# Patient Record
Sex: FEMALE | Race: WHITE | NOT HISPANIC OR LATINO
[De-identification: names, ages, dates, MRNs, and addresses within clinical notes are randomized per-mention and may not be internally consistent; named-entity substitution may affect disease eponyms.]

---

## 2017-01-19 ENCOUNTER — NON-APPOINTMENT (OUTPATIENT)
Age: 77
End: 2017-01-19

## 2017-01-19 ENCOUNTER — APPOINTMENT (OUTPATIENT)
Dept: CARDIOLOGY | Facility: CLINIC | Age: 77
End: 2017-01-19

## 2017-01-19 VITALS
OXYGEN SATURATION: 90 % | HEIGHT: 58 IN | WEIGHT: 150 LBS | HEART RATE: 70 BPM | BODY MASS INDEX: 31.49 KG/M2 | SYSTOLIC BLOOD PRESSURE: 133 MMHG | TEMPERATURE: 98 F | DIASTOLIC BLOOD PRESSURE: 70 MMHG

## 2017-01-22 ENCOUNTER — NON-APPOINTMENT (OUTPATIENT)
Age: 77
End: 2017-01-22

## 2017-01-25 ENCOUNTER — NON-APPOINTMENT (OUTPATIENT)
Age: 77
End: 2017-01-25

## 2017-01-31 ENCOUNTER — MEDICATION RENEWAL (OUTPATIENT)
Age: 77
End: 2017-01-31

## 2017-02-10 ENCOUNTER — RX RENEWAL (OUTPATIENT)
Age: 77
End: 2017-02-10

## 2017-05-24 ENCOUNTER — MEDICATION RENEWAL (OUTPATIENT)
Age: 77
End: 2017-05-24

## 2017-06-20 ENCOUNTER — NON-APPOINTMENT (OUTPATIENT)
Age: 77
End: 2017-06-20

## 2017-06-20 ENCOUNTER — APPOINTMENT (OUTPATIENT)
Dept: CARDIOLOGY | Facility: CLINIC | Age: 77
End: 2017-06-20

## 2017-06-20 VITALS
DIASTOLIC BLOOD PRESSURE: 70 MMHG | SYSTOLIC BLOOD PRESSURE: 125 MMHG | TEMPERATURE: 97.9 F | WEIGHT: 149 LBS | BODY MASS INDEX: 31.28 KG/M2 | HEART RATE: 61 BPM | OXYGEN SATURATION: 96 % | HEIGHT: 58 IN

## 2017-06-22 LAB
ALBUMIN SERPL ELPH-MCNC: 4.3 G/DL
ALP BLD-CCNC: 74 U/L
ALT SERPL-CCNC: 19 U/L
ANION GAP SERPL CALC-SCNC: 21 MMOL/L
AST SERPL-CCNC: 17 U/L
BASOPHILS # BLD AUTO: 0.04 K/UL
BASOPHILS NFR BLD AUTO: 0.6 %
BILIRUB SERPL-MCNC: 0.8 MG/DL
BUN SERPL-MCNC: 17 MG/DL
CALCIUM SERPL-MCNC: 10.2 MG/DL
CHLORIDE SERPL-SCNC: 96 MMOL/L
CHOLEST SERPL-MCNC: 165 MG/DL
CHOLEST/HDLC SERPL: 2 RATIO
CO2 SERPL-SCNC: 20 MMOL/L
CREAT SERPL-MCNC: 0.67 MG/DL
EOSINOPHIL # BLD AUTO: 0.06 K/UL
EOSINOPHIL NFR BLD AUTO: 0.9 %
GLUCOSE SERPL-MCNC: 101 MG/DL
HBA1C MFR BLD HPLC: 5.9 %
HCT VFR BLD CALC: 39 %
HDLC SERPL-MCNC: 81 MG/DL
HGB BLD-MCNC: 12.7 G/DL
IMM GRANULOCYTES NFR BLD AUTO: 0.2 %
LDLC SERPL CALC-MCNC: 72 MG/DL
LYMPHOCYTES # BLD AUTO: 1.52 K/UL
LYMPHOCYTES NFR BLD AUTO: 23.6 %
MAN DIFF?: NORMAL
MCHC RBC-ENTMCNC: 29.5 PG
MCHC RBC-ENTMCNC: 32.6 GM/DL
MCV RBC AUTO: 90.5 FL
MONOCYTES # BLD AUTO: 0.69 K/UL
MONOCYTES NFR BLD AUTO: 10.7 %
NEUTROPHILS # BLD AUTO: 4.13 K/UL
NEUTROPHILS NFR BLD AUTO: 64 %
PLATELET # BLD AUTO: 232 K/UL
POTASSIUM SERPL-SCNC: 4.4 MMOL/L
PROT SERPL-MCNC: 7 G/DL
RBC # BLD: 4.31 M/UL
RBC # FLD: 13.4 %
SODIUM SERPL-SCNC: 137 MMOL/L
TRIGL SERPL-MCNC: 61 MG/DL
TSH SERPL-ACNC: 1.32 UIU/ML
WBC # FLD AUTO: 6.45 K/UL

## 2017-07-27 ENCOUNTER — MEDICATION RENEWAL (OUTPATIENT)
Age: 77
End: 2017-07-27

## 2017-08-04 ENCOUNTER — MEDICATION RENEWAL (OUTPATIENT)
Age: 77
End: 2017-08-04

## 2017-10-30 ENCOUNTER — APPOINTMENT (OUTPATIENT)
Dept: CARDIOLOGY | Facility: CLINIC | Age: 77
End: 2017-10-30
Payer: MEDICARE

## 2017-10-30 PROCEDURE — 93000 ELECTROCARDIOGRAM COMPLETE: CPT

## 2017-10-30 PROCEDURE — 99214 OFFICE O/P EST MOD 30 MIN: CPT

## 2017-10-30 PROCEDURE — 36415 COLL VENOUS BLD VENIPUNCTURE: CPT

## 2017-11-01 ENCOUNTER — MEDICATION RENEWAL (OUTPATIENT)
Age: 77
End: 2017-11-01

## 2017-12-19 ENCOUNTER — APPOINTMENT (OUTPATIENT)
Dept: CARDIOLOGY | Facility: CLINIC | Age: 77
End: 2017-12-19
Payer: MEDICARE

## 2017-12-19 ENCOUNTER — APPOINTMENT (OUTPATIENT)
Dept: CARDIOLOGY | Facility: CLINIC | Age: 77
End: 2017-12-19

## 2017-12-19 ENCOUNTER — NON-APPOINTMENT (OUTPATIENT)
Age: 77
End: 2017-12-19

## 2017-12-19 VITALS — DIASTOLIC BLOOD PRESSURE: 80 MMHG | SYSTOLIC BLOOD PRESSURE: 140 MMHG

## 2017-12-19 VITALS
WEIGHT: 151 LBS | BODY MASS INDEX: 31.56 KG/M2 | SYSTOLIC BLOOD PRESSURE: 170 MMHG | HEART RATE: 68 BPM | OXYGEN SATURATION: 97 % | DIASTOLIC BLOOD PRESSURE: 86 MMHG

## 2017-12-19 DIAGNOSIS — M79.1 MYALGIA: ICD-10-CM

## 2017-12-19 PROCEDURE — 99214 OFFICE O/P EST MOD 30 MIN: CPT

## 2017-12-19 PROCEDURE — 36415 COLL VENOUS BLD VENIPUNCTURE: CPT

## 2017-12-19 PROCEDURE — 93000 ELECTROCARDIOGRAM COMPLETE: CPT

## 2017-12-20 LAB — ERYTHROCYTE [SEDIMENTATION RATE] IN BLOOD BY WESTERGREN METHOD: 22 MM/HR

## 2018-01-28 ENCOUNTER — TRANSCRIPTION ENCOUNTER (OUTPATIENT)
Age: 78
End: 2018-01-28

## 2018-05-29 ENCOUNTER — RX RENEWAL (OUTPATIENT)
Age: 78
End: 2018-05-29

## 2018-06-11 ENCOUNTER — APPOINTMENT (OUTPATIENT)
Dept: CARDIOLOGY | Facility: CLINIC | Age: 78
End: 2018-06-11
Payer: MEDICARE

## 2018-06-11 ENCOUNTER — NON-APPOINTMENT (OUTPATIENT)
Age: 78
End: 2018-06-11

## 2018-06-11 VITALS
HEART RATE: 68 BPM | OXYGEN SATURATION: 97 % | BODY MASS INDEX: 29.89 KG/M2 | DIASTOLIC BLOOD PRESSURE: 76 MMHG | SYSTOLIC BLOOD PRESSURE: 184 MMHG | WEIGHT: 143 LBS

## 2018-06-11 PROCEDURE — 99214 OFFICE O/P EST MOD 30 MIN: CPT

## 2018-06-11 PROCEDURE — 93000 ELECTROCARDIOGRAM COMPLETE: CPT

## 2018-06-12 ENCOUNTER — MEDICATION RENEWAL (OUTPATIENT)
Age: 78
End: 2018-06-12

## 2018-06-12 LAB
ALBUMIN SERPL ELPH-MCNC: 4 G/DL
ALP BLD-CCNC: 72 U/L
ALT SERPL-CCNC: 22 U/L
ANION GAP SERPL CALC-SCNC: 16 MMOL/L
AST SERPL-CCNC: 17 U/L
BASOPHILS # BLD AUTO: 0.01 K/UL
BASOPHILS NFR BLD AUTO: 0.1 %
BILIRUB SERPL-MCNC: 0.5 MG/DL
BUN SERPL-MCNC: 19 MG/DL
CALCIUM SERPL-MCNC: 9.6 MG/DL
CHLORIDE SERPL-SCNC: 97 MMOL/L
CHOLEST SERPL-MCNC: 173 MG/DL
CHOLEST/HDLC SERPL: 1.9 RATIO
CO2 SERPL-SCNC: 24 MMOL/L
CREAT SERPL-MCNC: 0.53 MG/DL
EOSINOPHIL # BLD AUTO: 0.04 K/UL
EOSINOPHIL NFR BLD AUTO: 0.5 %
GLUCOSE SERPL-MCNC: 102 MG/DL
HBA1C MFR BLD HPLC: 5.9 %
HCT VFR BLD CALC: 39.1 %
HDLC SERPL-MCNC: 91 MG/DL
HGB BLD-MCNC: 12.3 G/DL
IMM GRANULOCYTES NFR BLD AUTO: 0.3 %
LDLC SERPL CALC-MCNC: 71 MG/DL
LYMPHOCYTES # BLD AUTO: 1.45 K/UL
LYMPHOCYTES NFR BLD AUTO: 19.8 %
MAN DIFF?: NORMAL
MCHC RBC-ENTMCNC: 30.5 PG
MCHC RBC-ENTMCNC: 31.5 GM/DL
MCV RBC AUTO: 97 FL
MONOCYTES # BLD AUTO: 0.75 K/UL
MONOCYTES NFR BLD AUTO: 10.2 %
NEUTROPHILS # BLD AUTO: 5.05 K/UL
NEUTROPHILS NFR BLD AUTO: 69.1 %
PLATELET # BLD AUTO: 224 K/UL
POTASSIUM SERPL-SCNC: 4.6 MMOL/L
PROT SERPL-MCNC: 6.4 G/DL
RBC # BLD: 4.03 M/UL
RBC # FLD: 14 %
SODIUM SERPL-SCNC: 137 MMOL/L
TRIGL SERPL-MCNC: 55 MG/DL
TSH SERPL-ACNC: 1.46 UIU/ML
WBC # FLD AUTO: 7.32 K/UL

## 2018-06-19 ENCOUNTER — MEDICATION RENEWAL (OUTPATIENT)
Age: 78
End: 2018-06-19

## 2018-08-13 ENCOUNTER — MEDICATION RENEWAL (OUTPATIENT)
Age: 78
End: 2018-08-13

## 2018-08-14 ENCOUNTER — RX RENEWAL (OUTPATIENT)
Age: 78
End: 2018-08-14

## 2018-08-27 ENCOUNTER — APPOINTMENT (OUTPATIENT)
Dept: CARDIOLOGY | Facility: CLINIC | Age: 78
End: 2018-08-27
Payer: MEDICARE

## 2018-08-27 ENCOUNTER — NON-APPOINTMENT (OUTPATIENT)
Age: 78
End: 2018-08-27

## 2018-08-27 VITALS
BODY MASS INDEX: 31.07 KG/M2 | OXYGEN SATURATION: 97 % | SYSTOLIC BLOOD PRESSURE: 151 MMHG | HEIGHT: 58 IN | WEIGHT: 148 LBS | HEART RATE: 75 BPM | TEMPERATURE: 98.1 F | DIASTOLIC BLOOD PRESSURE: 70 MMHG

## 2018-08-27 DIAGNOSIS — R73.9 HYPERGLYCEMIA, UNSPECIFIED: ICD-10-CM

## 2018-08-27 PROCEDURE — 93000 ELECTROCARDIOGRAM COMPLETE: CPT

## 2018-08-27 PROCEDURE — 99214 OFFICE O/P EST MOD 30 MIN: CPT

## 2018-10-06 ENCOUNTER — TRANSCRIPTION ENCOUNTER (OUTPATIENT)
Age: 78
End: 2018-10-06

## 2018-10-23 ENCOUNTER — APPOINTMENT (OUTPATIENT)
Dept: CARDIOLOGY | Facility: CLINIC | Age: 78
End: 2018-10-23
Payer: MEDICARE

## 2018-10-23 PROCEDURE — G0008: CPT

## 2018-10-23 PROCEDURE — 90662 IIV NO PRSV INCREASED AG IM: CPT

## 2018-11-29 ENCOUNTER — APPOINTMENT (OUTPATIENT)
Dept: CARDIOLOGY | Facility: CLINIC | Age: 78
End: 2018-11-29

## 2018-11-30 ENCOUNTER — APPOINTMENT (OUTPATIENT)
Dept: CARDIOLOGY | Facility: CLINIC | Age: 78
End: 2018-11-30
Payer: MEDICARE

## 2018-11-30 ENCOUNTER — NON-APPOINTMENT (OUTPATIENT)
Age: 78
End: 2018-11-30

## 2018-11-30 VITALS
DIASTOLIC BLOOD PRESSURE: 80 MMHG | BODY MASS INDEX: 31.07 KG/M2 | OXYGEN SATURATION: 99 % | SYSTOLIC BLOOD PRESSURE: 138 MMHG | WEIGHT: 148 LBS | HEART RATE: 68 BPM | HEIGHT: 58 IN

## 2018-11-30 PROCEDURE — 99214 OFFICE O/P EST MOD 30 MIN: CPT

## 2018-11-30 PROCEDURE — 93000 ELECTROCARDIOGRAM COMPLETE: CPT

## 2018-12-26 ENCOUNTER — NON-APPOINTMENT (OUTPATIENT)
Age: 78
End: 2018-12-26

## 2018-12-26 ENCOUNTER — APPOINTMENT (OUTPATIENT)
Dept: CARDIOLOGY | Facility: CLINIC | Age: 78
End: 2018-12-26
Payer: MEDICARE

## 2018-12-26 ENCOUNTER — RX RENEWAL (OUTPATIENT)
Age: 78
End: 2018-12-26

## 2018-12-26 VITALS
HEIGHT: 58 IN | SYSTOLIC BLOOD PRESSURE: 145 MMHG | TEMPERATURE: 99.1 F | HEART RATE: 72 BPM | OXYGEN SATURATION: 97 % | BODY MASS INDEX: 31.7 KG/M2 | WEIGHT: 151 LBS | DIASTOLIC BLOOD PRESSURE: 67 MMHG

## 2018-12-26 DIAGNOSIS — R07.9 CHEST PAIN, UNSPECIFIED: ICD-10-CM

## 2018-12-26 DIAGNOSIS — J20.9 ACUTE BRONCHITIS, UNSPECIFIED: ICD-10-CM

## 2018-12-26 PROCEDURE — 93000 ELECTROCARDIOGRAM COMPLETE: CPT

## 2018-12-26 PROCEDURE — 36415 COLL VENOUS BLD VENIPUNCTURE: CPT

## 2018-12-26 PROCEDURE — 99214 OFFICE O/P EST MOD 30 MIN: CPT

## 2018-12-26 NOTE — PHYSICAL EXAM
[General Appearance - Well Developed] : well developed [Normal Appearance] : normal appearance [Well Groomed] : well groomed [General Appearance - Well Nourished] : well nourished [No Deformities] : no deformities [General Appearance - In No Acute Distress] : no acute distress [Normal Conjunctiva] : the conjunctiva exhibited no abnormalities [Eyelids - No Xanthelasma] : the eyelids demonstrated no xanthelasmas [Normal Oral Mucosa] : normal oral mucosa [No Oral Pallor] : no oral pallor [No Oral Cyanosis] : no oral cyanosis [Normal Jugular Venous A Waves Present] : normal jugular venous A waves present [Normal Jugular Venous V Waves Present] : normal jugular venous V waves present [No Jugular Venous Cuevas A Waves] : no jugular venous cuevas A waves [Respiration, Rhythm And Depth] : normal respiratory rhythm and effort [Exaggerated Use Of Accessory Muscles For Inspiration] : no accessory muscle use [Auscultation Breath Sounds / Voice Sounds] : lungs were clear to auscultation bilaterally [Heart Rate And Rhythm] : heart rate and rhythm were normal [Heart Sounds] : normal S1 and S2 [Murmurs] : no murmurs present [Abdomen Soft] : soft [Abdomen Tenderness] : non-tender [Abdomen Mass (___ Cm)] : no abdominal mass palpated [Abnormal Walk] : normal gait [Gait - Sufficient For Exercise Testing] : the gait was sufficient for exercise testing [Nail Clubbing] : no clubbing of the fingernails [Cyanosis, Localized] : no localized cyanosis [Petechial Hemorrhages (___cm)] : no petechial hemorrhages [Skin Color & Pigmentation] : normal skin color and pigmentation [] : no rash [No Venous Stasis] : no venous stasis [Skin Lesions] : no skin lesions [No Skin Ulcers] : no skin ulcer [No Xanthoma] : no  xanthoma was observed [Oriented To Time, Place, And Person] : oriented to person, place, and time [Affect] : the affect was normal [Mood] : the mood was normal [FreeTextEntry1] : anxious

## 2018-12-26 NOTE — REVIEW OF SYSTEMS
[Loss Of Hearing] : hearing loss [Dyspnea on exertion] : dyspnea during exertion [Joint Pain] : joint pain [Negative] : Heme/Lymph [Fever] : no fever [Recent Weight Gain (___ Lbs)] : no recent weight gain [Chills] : no chills [Recent Weight Loss (___ Lbs)] : no recent weight loss [Chest  Pressure] : no chest pressure [Chest Pain] : chest pain [Lower Ext Edema] : no extremity edema [Leg Claudication] : no intermittent leg claudication [Palpitations] : no palpitations [see HPI] : see HPI [Cough] : cough [Wheezing] : no wheezing [Coughing Up Blood] : no hemoptysis [Abdominal Pain] : no abdominal pain [Nausea] : no nausea [Heartburn] : no heartburn [Change in Appetite] : no change in appetite [Change In The Stool] : no change in stool [Dysuria] : no dysuria [Dizziness] : no dizziness [Anxiety] : anxiety

## 2018-12-26 NOTE — REASON FOR VISIT
[FreeTextEntry1] : The patient is a 78-year-old woman with a history of hypertension, hypercholesterolemia, and carotid stenosis for visit for edema

## 2018-12-26 NOTE — HISTORY OF PRESENT ILLNESS
[FreeTextEntry1] : June 20, 2017. 77-year-old woman with diabetes mellitus, hyperlipidemia, hypertension who continues to complain of palpitations when she is stressed. She feels her heart being fast. It lasts for a minute or 2. The palpitations then gradually slowed down. She has no associated symptoms. The patient denies exertional dyspnea or chest pressure. She has no lightheadedness or ankle swelling.\par October 30, 2017. Saw Dr. Rosenstein for blurred vision suddenly (not amaurosis. Normal carotid Doppler 10/26/2017. He noted skipped beats or irregular pulse. Here now, feels fine except occasional  rapid beats. In sinus but occasional PC's on exam, with sx. Recent labs with Cholesterol 161, TG 65, HDL 74, LDL 73. HbA1c 5.8. Normal TFTs. Previous echo with minimal MR, AS?AI. Holter with only symptomatic VPCs.\par August 27, 2018. Here for concern over new edema left leg. Not tender,red, hot, etc. No trauma but has arthritis in that knee and recent failed steroid injection. Concerned it's her heart. Workup negative for cardiac and venous Doppler negative for DVT, but positive for Bakers cyst. It was drained by radiology.\par November 30, 2018. Patient here in followup. Had labs October 5, with normal CMP, fasting glucose 98, cholesterol 178, HDL 84, LDL 80. Normal TSH, normal CBC. Asked about baby aspirin, which she has been taking for years. Had negative carotid Dopplers a few years ago, with no evidence of plaque by neurology. No indication for aspirin, so she will stop it. Complained about trouble sleeping, and once tried Ambien from her internist, and didn't like it, but she will followup with the internist.

## 2018-12-26 NOTE — PHYSICAL EXAM
[General Appearance - Well Developed] : well developed [Normal Appearance] : normal appearance [Well Groomed] : well groomed [General Appearance - Well Nourished] : well nourished [No Deformities] : no deformities [General Appearance - In No Acute Distress] : no acute distress [Normal Conjunctiva] : the conjunctiva exhibited no abnormalities [Eyelids - No Xanthelasma] : the eyelids demonstrated no xanthelasmas [Normal Oral Mucosa] : normal oral mucosa [No Oral Pallor] : no oral pallor [No Oral Cyanosis] : no oral cyanosis [Normal Jugular Venous A Waves Present] : normal jugular venous A waves present [Normal Jugular Venous V Waves Present] : normal jugular venous V waves present [No Jugular Venous Cuevas A Waves] : no jugular venous cuevas A waves [Respiration, Rhythm And Depth] : normal respiratory rhythm and effort [Exaggerated Use Of Accessory Muscles For Inspiration] : no accessory muscle use [Heart Rate And Rhythm] : heart rate and rhythm were normal [Heart Sounds] : normal S1 and S2 [Murmurs] : no murmurs present [Abdomen Soft] : soft [Abdomen Tenderness] : non-tender [Abdomen Mass (___ Cm)] : no abdominal mass palpated [Abnormal Walk] : normal gait [Gait - Sufficient For Exercise Testing] : the gait was sufficient for exercise testing [Nail Clubbing] : no clubbing of the fingernails [Cyanosis, Localized] : no localized cyanosis [Petechial Hemorrhages (___cm)] : no petechial hemorrhages [Skin Color & Pigmentation] : normal skin color and pigmentation [] : no rash [No Venous Stasis] : no venous stasis [Skin Lesions] : no skin lesions [No Skin Ulcers] : no skin ulcer [No Xanthoma] : no  xanthoma was observed [Oriented To Time, Place, And Person] : oriented to person, place, and time [Affect] : the affect was normal [Mood] : the mood was normal [FreeTextEntry1] : anxious

## 2018-12-26 NOTE — HISTORY OF PRESENT ILLNESS
[FreeTextEntry1] : June 20, 2017. 77-year-old woman with diabetes mellitus, hyperlipidemia, hypertension who continues to complain of palpitations when she is stressed. She feels her heart being fast. It lasts for a minute or 2. The palpitations then gradually slowed down. She has no associated symptoms. The patient denies exertional dyspnea or chest pressure. She has no lightheadedness or ankle swelling.\par October 30, 2017. Saw Dr. Rosenstein for blurred vision suddenly (not amaurosis). Normal carotid Doppler 10/26/2017. He noted skipped beats or irregular pulse. Here now, feels fine except occasional  rapid beats. In sinus but occasional PC's on exam, with sx. Recent labs with Cholesterol 161, TG 65, HDL 74, LDL 73. HbA1c 5.8. Normal TFTs. Previous echo with minimal MR, AS?AI. Holter with only symptomatic VPCs.\par August 27, 2018. Here for concern over new edema left leg. Not tender,red, hot, etc. No trauma but has arthritis in that knee and recent failed steroid injection. Concerned it's her heart. Workup negative for cardiac and venous Doppler negative for DVT, but positive for Bakers cyst. It was drained by radiology.\par November 30, 2018. Patient here in followup. Had labs October 5, with normal CMP, fasting glucose 98, cholesterol 178, HDL 84, LDL 80. Normal TSH, normal CBC. Asked about baby aspirin, which she has been taking for years. Had negative carotid Dopplers a few years ago, with no evidence of plaque by neurology. No indication for aspirin, so she will stop it. Complained about trouble sleeping, and once tried Ambien from her internist, and didn't like it, but she will followup with the internist.\par December 26, 2018. Patient called earlier today. She had chest pain over Jules day and is concerned. She's also had worsening of her edema and was told to stop her steroids.\par EKG here is sinus rhythm with APCs, but otherwise within normal limits. \par Actually had symptoms of cough, and her internist gave her both Tamiflu and c and then subsequently gave her a Medrol pack, possibly twic She is better than initially, but still has symptoms of shortness of breath with exertion, especially when she tried to walk. Both her dogs. The other night, and concerned about the edema in her legs. Still has productive cough as well and is concerned about being able to make her flight to [place] on January 6. She also cannot hear out of her left ear because it is clogged\par

## 2018-12-26 NOTE — REASON FOR VISIT
[FreeTextEntry1] : The patient is a 78-year-old woman with a history of hypertension, hypercholesterolemia, and carotid stenosis for visit for urgent visit re: chest pain and edema

## 2018-12-26 NOTE — REVIEW OF SYSTEMS
[Loss Of Hearing] : hearing loss [Dyspnea on exertion] : dyspnea during exertion [see HPI] : see HPI [Joint Pain] : joint pain [Negative] : Heme/Lymph [Recent Weight Gain (___ Lbs)] : no recent weight gain [Recent Weight Loss (___ Lbs)] : no recent weight loss [Chest  Pressure] : no chest pressure [Chest Pain] : no chest pain [Lower Ext Edema] : no extremity edema [Leg Claudication] : no intermittent leg claudication [Palpitations] : no palpitations [Cough] : no cough [Wheezing] : no wheezing [Coughing Up Blood] : no hemoptysis [Abdominal Pain] : no abdominal pain [Nausea] : no nausea [Heartburn] : no heartburn [Change in Appetite] : no change in appetite [Dysuria] : no dysuria [Dizziness] : no dizziness

## 2018-12-27 LAB
ALBUMIN SERPL ELPH-MCNC: 3.8 G/DL
ALP BLD-CCNC: 60 U/L
ALT SERPL-CCNC: 17 U/L
ANION GAP SERPL CALC-SCNC: 13 MMOL/L
AST SERPL-CCNC: 15 U/L
BASOPHILS # BLD AUTO: 0.02 K/UL
BASOPHILS NFR BLD AUTO: 0.2 %
BILIRUB SERPL-MCNC: 0.5 MG/DL
BUN SERPL-MCNC: 18 MG/DL
CALCIUM SERPL-MCNC: 9.5 MG/DL
CHLORIDE SERPL-SCNC: 100 MMOL/L
CHOLEST SERPL-MCNC: 149 MG/DL
CHOLEST/HDLC SERPL: 2.4 RATIO
CO2 SERPL-SCNC: 25 MMOL/L
CREAT SERPL-MCNC: 0.67 MG/DL
EOSINOPHIL # BLD AUTO: 0.05 K/UL
EOSINOPHIL NFR BLD AUTO: 0.6 %
GLUCOSE SERPL-MCNC: 84 MG/DL
HBA1C MFR BLD HPLC: 6.6 %
HCT VFR BLD CALC: 34.8 %
HDLC SERPL-MCNC: 63 MG/DL
HGB BLD-MCNC: 11.1 G/DL
IMM GRANULOCYTES NFR BLD AUTO: 0.4 %
LDLC SERPL CALC-MCNC: 71 MG/DL
LYMPHOCYTES # BLD AUTO: 1.75 K/UL
LYMPHOCYTES NFR BLD AUTO: 19.5 %
MAN DIFF?: NORMAL
MCHC RBC-ENTMCNC: 29.3 PG
MCHC RBC-ENTMCNC: 31.9 GM/DL
MCV RBC AUTO: 91.8 FL
MONOCYTES # BLD AUTO: 0.82 K/UL
MONOCYTES NFR BLD AUTO: 9.1 %
NEUTROPHILS # BLD AUTO: 6.29 K/UL
NEUTROPHILS NFR BLD AUTO: 70.2 %
NT-PROBNP SERPL-MCNC: 65 PG/ML
PLATELET # BLD AUTO: 326 K/UL
POTASSIUM SERPL-SCNC: 4.4 MMOL/L
PROT SERPL-MCNC: 7 G/DL
RBC # BLD: 3.79 M/UL
RBC # FLD: 14 %
SODIUM SERPL-SCNC: 138 MMOL/L
TRIGL SERPL-MCNC: 74 MG/DL
WBC # FLD AUTO: 8.97 K/UL

## 2019-02-21 ENCOUNTER — MEDICATION RENEWAL (OUTPATIENT)
Age: 79
End: 2019-02-21

## 2019-02-22 ENCOUNTER — MEDICATION RENEWAL (OUTPATIENT)
Age: 79
End: 2019-02-22

## 2019-03-05 ENCOUNTER — TRANSCRIPTION ENCOUNTER (OUTPATIENT)
Age: 79
End: 2019-03-05

## 2019-05-07 ENCOUNTER — MEDICATION RENEWAL (OUTPATIENT)
Age: 79
End: 2019-05-07

## 2019-05-16 ENCOUNTER — RX RENEWAL (OUTPATIENT)
Age: 79
End: 2019-05-16

## 2019-05-20 ENCOUNTER — MEDICATION RENEWAL (OUTPATIENT)
Age: 79
End: 2019-05-20

## 2019-07-29 ENCOUNTER — LABORATORY RESULT (OUTPATIENT)
Age: 79
End: 2019-07-29

## 2019-07-29 ENCOUNTER — MEDICATION RENEWAL (OUTPATIENT)
Age: 79
End: 2019-07-29

## 2019-07-29 ENCOUNTER — APPOINTMENT (OUTPATIENT)
Dept: CARDIOLOGY | Facility: CLINIC | Age: 79
End: 2019-07-29
Payer: MEDICARE

## 2019-07-29 ENCOUNTER — NON-APPOINTMENT (OUTPATIENT)
Age: 79
End: 2019-07-29

## 2019-07-29 VITALS — HEART RATE: 63 BPM | SYSTOLIC BLOOD PRESSURE: 175 MMHG | DIASTOLIC BLOOD PRESSURE: 75 MMHG

## 2019-07-29 VITALS
HEART RATE: 68 BPM | DIASTOLIC BLOOD PRESSURE: 76 MMHG | WEIGHT: 148 LBS | SYSTOLIC BLOOD PRESSURE: 183 MMHG | OXYGEN SATURATION: 99 % | HEIGHT: 58 IN | TEMPERATURE: 97.9 F | BODY MASS INDEX: 31.07 KG/M2

## 2019-07-29 VITALS — HEART RATE: 58 BPM | DIASTOLIC BLOOD PRESSURE: 80 MMHG | SYSTOLIC BLOOD PRESSURE: 154 MMHG

## 2019-07-29 DIAGNOSIS — G47.00 INSOMNIA, UNSPECIFIED: ICD-10-CM

## 2019-07-29 PROCEDURE — 36415 COLL VENOUS BLD VENIPUNCTURE: CPT

## 2019-07-29 PROCEDURE — 93000 ELECTROCARDIOGRAM COMPLETE: CPT

## 2019-07-29 PROCEDURE — 99214 OFFICE O/P EST MOD 30 MIN: CPT

## 2019-07-29 NOTE — HISTORY OF PRESENT ILLNESS
[FreeTextEntry1] : June 20, 2017. 77-year-old woman with diabetes mellitus, hyperlipidemia, hypertension who continues to complain of palpitations when she is stressed. She feels her heart being fast. It lasts for a minute or 2. The palpitations then gradually slowed down. She has no associated symptoms. The patient denies exertional dyspnea or chest pressure. She has no lightheadedness or ankle swelling.\par October 30, 2017. Saw Dr. Rosenstein for blurred vision suddenly (not amaurosis). Normal carotid Doppler 10/26/2017. He noted skipped beats or irregular pulse. Here now, feels fine except occasional  rapid beats. In sinus but occasional PC's on exam, with sx. Recent labs with Cholesterol 161, TG 65, HDL 74, LDL 73. HbA1c 5.8. Normal TFTs. Previous echo with minimal MR, AS?AI. Holter with only symptomatic VPCs.\par August 27, 2018. Here for concern over new edema left leg. Not tender,red, hot, etc. No trauma but has arthritis in that knee and recent failed steroid injection. Concerned it's her heart. Workup negative for cardiac and venous Doppler negative for DVT, but positive for Bakers cyst. It was drained by radiology.\par November 30, 2018. Patient here in followup. Had labs October 5, with normal CMP, fasting glucose 98, cholesterol 178, HDL 84, LDL 80. Normal TSH, normal CBC. Asked about baby aspirin, which she has been taking for years. Had negative carotid Dopplers a few years ago, with no evidence of plaque by neurology. No indication for aspirin, so she will stop it. Complained about trouble sleeping, and once tried Ambien from her internist, and didn't like it, but she will followup with the internist.\par December 26, 2018. Patient called earlier today. She had chest pain over Jules day and is concerned. She's also had worsening of her edema and was told to stop her steroids.\par EKG here is sinus rhythm with APCs, but otherwise within normal limits. \par Actually had symptoms of cough, and her internist gave her both Tamiflu and c and then subsequently gave her a Medrol pack, possibly twice She is better than initially, but still has symptoms of shortness of breath with exertion, especially when she tried to walk. Both her dogs. The other night, and concerned about the edema in her legs. Still has productive cough as well and is concerned about being able to make her flight to [place] on January 6. She also cannot hear out of her left ear because it is clogged\par May 2019. Patient called from [place], anxious that her blood pressure is always in the 140s to 150s over 70. Reassured her but then increased her verapamil from 180 q.12 h. to 240 q.12 h.\par July 29, 2019. Patient here in followup. Remains with sinus bradycardia at 58 with an otherwise normal ECG and no evidence of LVH. Outside readings of her blood pressure, mostly in the 130s occasionally even 120/60-70. She was told by the doctors in [place] of extra beats, but she never notices them. No other complaints except trouble sleeping and needing zolpidem.

## 2019-07-29 NOTE — REVIEW OF SYSTEMS
[Fever] : no fever [Chills] : no chills [Recent Weight Gain (___ Lbs)] : no recent weight gain [Recent Weight Loss (___ Lbs)] : no recent weight loss [Loss Of Hearing] : hearing loss [Chest  Pressure] : no chest pressure [Dyspnea on exertion] : dyspnea during exertion [Chest Pain] : no chest pain [Lower Ext Edema] : no extremity edema [Leg Claudication] : no intermittent leg claudication [Palpitations] : no palpitations [Cough] : no cough [Coughing Up Blood] : no hemoptysis [Wheezing] : no wheezing [Abdominal Pain] : no abdominal pain [Nausea] : no nausea [Change in Appetite] : no change in appetite [Change In The Stool] : no change in stool [Heartburn] : heartburn [Dysuria] : no dysuria [see HPI] : see HPI [Joint Pain] : joint pain [Dizziness] : no dizziness [Anxiety] : anxiety [Negative] : Heme/Lymph

## 2019-07-29 NOTE — REASON FOR VISIT
[FreeTextEntry1] : The patient is a 79-year-old woman with a history of hypertension, hypercholesterolemia, and carotid stenosis for visit

## 2019-08-04 LAB
ALBUMIN SERPL ELPH-MCNC: 4.1 G/DL
ALP BLD-CCNC: 69 U/L
ALT SERPL-CCNC: 18 U/L
ANION GAP SERPL CALC-SCNC: 10 MMOL/L
AST SERPL-CCNC: 19 U/L
BASOPHILS # BLD AUTO: 0.05 K/UL
BASOPHILS NFR BLD AUTO: 0.6 %
BILIRUB SERPL-MCNC: 0.5 MG/DL
BUN SERPL-MCNC: 15 MG/DL
CALCIUM SERPL-MCNC: 9.6 MG/DL
CHLORIDE SERPL-SCNC: 99 MMOL/L
CHOLEST SERPL-MCNC: 160 MG/DL
CHOLEST/HDLC SERPL: 2.2 RATIO
CO2 SERPL-SCNC: 27 MMOL/L
CREAT SERPL-MCNC: 0.48 MG/DL
EOSINOPHIL # BLD AUTO: 0.08 K/UL
EOSINOPHIL NFR BLD AUTO: 1 %
ERYTHROCYTE [SEDIMENTATION RATE] IN BLOOD BY WESTERGREN METHOD: 15 MM/HR
ESTIMATED AVERAGE GLUCOSE: 126 MG/DL
GLUCOSE SERPL-MCNC: 95 MG/DL
HBA1C MFR BLD HPLC: 6 %
HCT VFR BLD CALC: 41.5 %
HDLC SERPL-MCNC: 72 MG/DL
HGB BLD-MCNC: 13 G/DL
IMM GRANULOCYTES NFR BLD AUTO: 0.4 %
LDLC SERPL CALC-MCNC: 75 MG/DL
LYMPHOCYTES # BLD AUTO: 1.33 K/UL
LYMPHOCYTES NFR BLD AUTO: 16.3 %
MAN DIFF?: NORMAL
MCHC RBC-ENTMCNC: 30.5 PG
MCHC RBC-ENTMCNC: 31.3 GM/DL
MCV RBC AUTO: 97.4 FL
MONOCYTES # BLD AUTO: 0.89 K/UL
MONOCYTES NFR BLD AUTO: 10.9 %
NEUTROPHILS # BLD AUTO: 5.79 K/UL
NEUTROPHILS NFR BLD AUTO: 70.8 %
NT-PROBNP SERPL-MCNC: 84 PG/ML
PLATELET # BLD AUTO: 251 K/UL
POTASSIUM SERPL-SCNC: 4.7 MMOL/L
PROT SERPL-MCNC: 6.4 G/DL
RBC # BLD: 4.26 M/UL
RBC # FLD: 13.5 %
SODIUM SERPL-SCNC: 136 MMOL/L
T3RU NFR SERPL: 1.1 TBI
T4 FREE SERPL-MCNC: 1.1 NG/DL
T4 SERPL-MCNC: 6.8 UG/DL
TRIGL SERPL-MCNC: 65 MG/DL
TSH SERPL-ACNC: 1.71 UIU/ML
URATE SERPL-MCNC: 2.9 MG/DL
WBC # FLD AUTO: 8.17 K/UL

## 2019-08-06 ENCOUNTER — RX RENEWAL (OUTPATIENT)
Age: 79
End: 2019-08-06

## 2019-08-13 ENCOUNTER — MEDICATION RENEWAL (OUTPATIENT)
Age: 79
End: 2019-08-13

## 2019-09-05 ENCOUNTER — MEDICATION RENEWAL (OUTPATIENT)
Age: 79
End: 2019-09-05

## 2019-11-07 ENCOUNTER — NON-APPOINTMENT (OUTPATIENT)
Age: 79
End: 2019-11-07

## 2019-11-07 ENCOUNTER — APPOINTMENT (OUTPATIENT)
Dept: CARDIOLOGY | Facility: CLINIC | Age: 79
End: 2019-11-07
Payer: MEDICARE

## 2019-11-07 VITALS
BODY MASS INDEX: 31.07 KG/M2 | HEART RATE: 60 BPM | SYSTOLIC BLOOD PRESSURE: 129 MMHG | DIASTOLIC BLOOD PRESSURE: 75 MMHG | OXYGEN SATURATION: 98 % | WEIGHT: 148 LBS | HEIGHT: 58 IN

## 2019-11-07 DIAGNOSIS — R00.2 PALPITATIONS: ICD-10-CM

## 2019-11-07 PROCEDURE — 93000 ELECTROCARDIOGRAM COMPLETE: CPT

## 2019-11-07 PROCEDURE — 99214 OFFICE O/P EST MOD 30 MIN: CPT

## 2019-11-07 PROCEDURE — 36415 COLL VENOUS BLD VENIPUNCTURE: CPT

## 2019-11-07 NOTE — REVIEW OF SYSTEMS
[Recent Weight Gain (___ Lbs)] : no recent weight gain [Fever] : no fever [Recent Weight Loss (___ Lbs)] : no recent weight loss [Chills] : no chills [Loss Of Hearing] : hearing loss [Chest  Pressure] : no chest pressure [Dyspnea on exertion] : dyspnea during exertion [Chest Pain] : no chest pain [Lower Ext Edema] : no extremity edema [Leg Claudication] : no intermittent leg claudication [Palpitations] : no palpitations [Cough] : no cough [Coughing Up Blood] : no hemoptysis [Wheezing] : no wheezing [Abdominal Pain] : no abdominal pain [Nausea] : no nausea [Change In The Stool] : no change in stool [Change in Appetite] : no change in appetite [Heartburn] : heartburn [Joint Pain] : joint pain [Dysuria] : no dysuria [see HPI] : see HPI [Dizziness] : no dizziness [Anxiety] : anxiety [Negative] : Endocrine

## 2019-11-07 NOTE — PHYSICAL EXAM
[General Appearance - Well Developed] : well developed [Normal Appearance] : normal appearance [No Deformities] : no deformities [General Appearance - Well Nourished] : well nourished [Well Groomed] : well groomed [Normal Conjunctiva] : the conjunctiva exhibited no abnormalities [General Appearance - In No Acute Distress] : no acute distress [Normal Oral Mucosa] : normal oral mucosa [No Oral Pallor] : no oral pallor [Eyelids - No Xanthelasma] : the eyelids demonstrated no xanthelasmas [Normal Jugular Venous A Waves Present] : normal jugular venous A waves present [No Oral Cyanosis] : no oral cyanosis [No Jugular Venous Cuevas A Waves] : no jugular venous cuevas A waves [Normal Jugular Venous V Waves Present] : normal jugular venous V waves present [Respiration, Rhythm And Depth] : normal respiratory rhythm and effort [Auscultation Breath Sounds / Voice Sounds] : lungs were clear to auscultation bilaterally [Exaggerated Use Of Accessory Muscles For Inspiration] : no accessory muscle use [Heart Rate And Rhythm] : heart rate and rhythm were normal [Heart Sounds] : normal S1 and S2 [Murmurs] : no murmurs present [Abdomen Soft] : soft [Abdomen Tenderness] : non-tender [Abdomen Mass (___ Cm)] : no abdominal mass palpated [Abnormal Walk] : normal gait [Gait - Sufficient For Exercise Testing] : the gait was sufficient for exercise testing [Nail Clubbing] : no clubbing of the fingernails [Cyanosis, Localized] : no localized cyanosis [Petechial Hemorrhages (___cm)] : no petechial hemorrhages [No Venous Stasis] : no venous stasis [] : no rash [Skin Color & Pigmentation] : normal skin color and pigmentation [No Skin Ulcers] : no skin ulcer [No Xanthoma] : no  xanthoma was observed [Skin Lesions] : no skin lesions [Affect] : the affect was normal [Oriented To Time, Place, And Person] : oriented to person, place, and time [Mood] : the mood was normal [FreeTextEntry1] : anxious

## 2019-11-07 NOTE — HISTORY OF PRESENT ILLNESS
[FreeTextEntry1] : June 20, 2017. 77-year-old woman with diabetes mellitus, hyperlipidemia, hypertension who continues to complain of palpitations when she is stressed. She feels her heart being fast. It lasts for a minute or 2. The palpitations then gradually slowed down. She has no associated symptoms. The patient denies exertional dyspnea or chest pressure. She has no lightheadedness or ankle swelling.\par October 30, 2017. Saw Dr. Rosenstein for blurred vision suddenly (not amaurosis). Normal carotid Doppler 10/26/2017. He noted skipped beats or irregular pulse. Here now, feels fine except occasional  rapid beats. In sinus but occasional PC's on exam, with sx. Recent labs with Cholesterol 161, TG 65, HDL 74, LDL 73. HbA1c 5.8. Normal TFTs. Previous echo with minimal MR, AS?AI. Holter with only symptomatic VPCs.\par August 27, 2018. Here for concern over new edema left leg. Not tender,red, hot, etc. No trauma but has arthritis in that knee and recent failed steroid injection. Concerned it's her heart. Workup negative for cardiac and venous Doppler negative for DVT, but positive for Bakers cyst. It was drained by radiology.\par November 30, 2018. Patient here in followup. Had labs October 5, with normal CMP, fasting glucose 98, cholesterol 178, HDL 84, LDL 80. Normal TSH, normal CBC. Asked about baby aspirin, which she has been taking for years. Had negative carotid Dopplers a few years ago, with no evidence of plaque by neurology. No indication for aspirin, so she will stop it. Complained about trouble sleeping, and once tried Ambien from her internist, and didn't like it, but she will followup with the internist.\par December 26, 2018. Patient called earlier today. She had chest pain over Jules day and is concerned. She's also had worsening of her edema and was told to stop her steroids.\par EKG here is sinus rhythm with APCs, but otherwise within normal limits. \par Actually had symptoms of cough, and her internist gave her both Tamiflu and c and then subsequently gave her a Medrol pack, possibly twice She is better than initially, but still has symptoms of shortness of breath with exertion, especially when she tried to walk. Both her dogs. The other night, and concerned about the edema in her legs. Still has productive cough as well and is concerned about being able to make her flight to [place] on January 6. She also cannot hear out of her left ear because it is clogged\par May 2019. Patient called from [place], anxious that her blood pressure is always in the 140s to 150s over 70. Reassured her but then increased her verapamil from 180 q.12 h. to 240 q.12 h.\par July 29, 2019. Patient here in followup. Remains with sinus bradycardia at 58 with an otherwise normal ECG and no evidence of LVH. Outside readings of her blood pressure, mostly in the 130s occasionally even 120/60-70. She was told by the doctors in [place] of extra beats, but she never notices them. No other complaints except trouble sleeping and needing zolpidem.\par November 7, 2019.  Patient returns in follow-up.  EKG remains in sinus rhythm 60 and unchanged.  Good spirits feeling well blood pressure excellent EKG within normal limits.  Her internist took a CBC along with a sed rate because of her previous history of PMR but all was acceptable.  Her orthopedist wants her to have a vitamin D level and a calcium level because of her history of osteoporosis.  She is a little confused as to whether to remain on extra calcium; she takes 500 with vitamin D.

## 2019-11-11 ENCOUNTER — RX CHANGE (OUTPATIENT)
Age: 79
End: 2019-11-11

## 2019-11-11 ENCOUNTER — MEDICATION RENEWAL (OUTPATIENT)
Age: 79
End: 2019-11-11

## 2019-11-11 LAB
ALBUMIN SERPL ELPH-MCNC: 4.3 G/DL
ALP BLD-CCNC: 71 U/L
ALT SERPL-CCNC: 20 U/L
ANION GAP SERPL CALC-SCNC: 14 MMOL/L
AST SERPL-CCNC: 15 U/L
BILIRUB SERPL-MCNC: 0.6 MG/DL
BUN SERPL-MCNC: 18 MG/DL
CALCIUM SERPL-MCNC: 9.9 MG/DL
CHLORIDE SERPL-SCNC: 98 MMOL/L
CHOLEST SERPL-MCNC: 164 MG/DL
CHOLEST/HDLC SERPL: 2.2 RATIO
CO2 SERPL-SCNC: 25 MMOL/L
CREAT SERPL-MCNC: 0.54 MG/DL
ESTIMATED AVERAGE GLUCOSE: 126 MG/DL
GLUCOSE SERPL-MCNC: 94 MG/DL
HBA1C MFR BLD HPLC: 6 %
HDLC SERPL-MCNC: 75 MG/DL
LDLC SERPL CALC-MCNC: 74 MG/DL
POTASSIUM SERPL-SCNC: 4.4 MMOL/L
PROT SERPL-MCNC: 6.5 G/DL
SODIUM SERPL-SCNC: 137 MMOL/L
TRIGL SERPL-MCNC: 73 MG/DL

## 2019-12-05 ENCOUNTER — APPOINTMENT (OUTPATIENT)
Dept: CARDIOLOGY | Facility: CLINIC | Age: 79
End: 2019-12-05
Payer: MEDICARE

## 2019-12-05 ENCOUNTER — RX RENEWAL (OUTPATIENT)
Age: 79
End: 2019-12-05

## 2019-12-05 ENCOUNTER — NON-APPOINTMENT (OUTPATIENT)
Age: 79
End: 2019-12-05

## 2019-12-05 VITALS
BODY MASS INDEX: 31.7 KG/M2 | HEIGHT: 58 IN | DIASTOLIC BLOOD PRESSURE: 71 MMHG | HEART RATE: 78 BPM | WEIGHT: 151 LBS | OXYGEN SATURATION: 95 % | SYSTOLIC BLOOD PRESSURE: 152 MMHG

## 2019-12-05 DIAGNOSIS — M17.12 UNILATERAL PRIMARY OSTEOARTHRITIS, LEFT KNEE: ICD-10-CM

## 2019-12-05 DIAGNOSIS — I49.3 VENTRICULAR PREMATURE DEPOLARIZATION: ICD-10-CM

## 2019-12-05 PROCEDURE — 93000 ELECTROCARDIOGRAM COMPLETE: CPT

## 2019-12-05 PROCEDURE — 99214 OFFICE O/P EST MOD 30 MIN: CPT

## 2019-12-05 NOTE — HISTORY OF PRESENT ILLNESS
[FreeTextEntry1] : June 20, 2017. 77-year-old woman with diabetes mellitus, hyperlipidemia, hypertension who continues to complain of palpitations when she is stressed. She feels her heart being fast. It lasts for a minute or 2. The palpitations then gradually slowed down. She has no associated symptoms. The patient denies exertional dyspnea or chest pressure. She has no lightheadedness or ankle swelling.\par October 30, 2017. Saw Dr. Rosenstein for blurred vision suddenly (not amaurosis). Normal carotid Doppler 10/26/2017. He noted skipped beats or irregular pulse. Here now, feels fine except occasional  rapid beats. In sinus but occasional PC's on exam, with sx. Recent labs with Cholesterol 161, TG 65, HDL 74, LDL 73. HbA1c 5.8. Normal TFTs. Previous echo with minimal MR, AS?AI. Holter with only symptomatic VPCs.\par August 27, 2018. Here for concern over new edema left leg. Not tender,red, hot, etc. No trauma but has arthritis in that knee and recent failed steroid injection. Concerned it's her heart. Workup negative for cardiac and venous Doppler negative for DVT, but positive for Bakers cyst. It was drained by radiology.\par November 30, 2018. Patient here in followup. Had labs October 5, with normal CMP, fasting glucose 98, cholesterol 178, HDL 84, LDL 80. Normal TSH, normal CBC. Asked about baby aspirin, which she has been taking for years. Had negative carotid Dopplers a few years ago, with no evidence of plaque by neurology. No indication for aspirin, so she will stop it. Complained about trouble sleeping, and once tried Ambien from her internist, and didn't like it, but she will followup with the internist.\par December 26, 2018. Patient called earlier today. She had chest pain over Jules day and is concerned. She's also had worsening of her edema and was told to stop her steroids.\par EKG here is sinus rhythm with APCs, but otherwise within normal limits. \par Actually had symptoms of cough, and her internist gave her both Tamiflu and c and then subsequently gave her a Medrol pack, possibly twice She is better than initially, but still has symptoms of shortness of breath with exertion, especially when she tried to walk. Both her dogs. The other night, and concerned about the edema in her legs. Still has productive cough as well and is concerned about being able to make her flight to [place] on January 6. She also cannot hear out of her left ear because it is clogged\par May 2019. Patient called from [place], anxious that her blood pressure is always in the 140s to 150s over 70. Reassured her but then increased her verapamil from 180 q.12 h. to 240 q.12 h.\par July 29, 2019. Patient here in followup. Remains with sinus bradycardia at 58 with an otherwise normal ECG and no evidence of LVH. Outside readings of her blood pressure, mostly in the 130s occasionally even 120/60-70. She was told by the doctors in [place] of extra beats, but she never notices them. No other complaints except trouble sleeping and needing zolpidem.\par November 7, 2019.  Patient returns in follow-up.  EKG remains in sinus rhythm 60 and unchanged.  Good spirits feeling well blood pressure excellent EKG within normal limits.  Her internist took a CBC along with a sed rate because of her previous history of PMR but all was acceptable.  Her orthopedist wants her to have a vitamin D level and a calcium level because of her history of osteoporosis.  She is a little confused as to whether to remain on extra calcium; she takes 500 with vitamin D.  \par December 5, 2019.  Patient called this morning and came in very upset because of edema of her right leg.  Last week she developed the edema and her internist put her on torsemide 20 mg and potassium 20 mEq.  After 3 days she became very lightheaded and dizzy.  He told her to stop it and then when it return he told her to resume and then cut it in half.  She came in today because she is very worried.  It is obvious that the edema is not related to heart failure and it is very mild.  Torsemide is fairly strong.  She had issues with swelling in the other leg about a year ago when she has had recent injections in her right knee for arthritis and a work-up for possible recurrence of PMR despite a normal sed rate.  There is no shortness of breath PND orthopnea chest pain or change in her other status

## 2019-12-05 NOTE — PHYSICAL EXAM
[General Appearance - Well Developed] : well developed [Normal Appearance] : normal appearance [Well Groomed] : well groomed [General Appearance - Well Nourished] : well nourished [No Deformities] : no deformities [General Appearance - In No Acute Distress] : no acute distress [Normal Conjunctiva] : the conjunctiva exhibited no abnormalities [Eyelids - No Xanthelasma] : the eyelids demonstrated no xanthelasmas [Normal Oral Mucosa] : normal oral mucosa [No Oral Pallor] : no oral pallor [No Oral Cyanosis] : no oral cyanosis [Normal Jugular Venous A Waves Present] : normal jugular venous A waves present [Normal Jugular Venous V Waves Present] : normal jugular venous V waves present [No Jugular Venous Cuevas A Waves] : no jugular venous cuevas A waves [Respiration, Rhythm And Depth] : normal respiratory rhythm and effort [Exaggerated Use Of Accessory Muscles For Inspiration] : no accessory muscle use [Auscultation Breath Sounds / Voice Sounds] : lungs were clear to auscultation bilaterally [Heart Rate And Rhythm] : heart rate and rhythm were normal [Heart Sounds] : normal S1 and S2 [Murmurs] : no murmurs present [Abdomen Soft] : soft [Abdomen Tenderness] : non-tender [Abdomen Mass (___ Cm)] : no abdominal mass palpated [Abnormal Walk] : normal gait [Gait - Sufficient For Exercise Testing] : the gait was sufficient for exercise testing [Nail Clubbing] : no clubbing of the fingernails [Cyanosis, Localized] : no localized cyanosis [Petechial Hemorrhages (___cm)] : no petechial hemorrhages [Skin Color & Pigmentation] : normal skin color and pigmentation [] : no rash [No Venous Stasis] : no venous stasis [Skin Lesions] : no skin lesions [No Skin Ulcers] : no skin ulcer [Oriented To Time, Place, And Person] : oriented to person, place, and time [Affect] : the affect was normal [No Xanthoma] : no  xanthoma was observed [Mood] : the mood was normal [FreeTextEntry1] : anxious

## 2019-12-05 NOTE — REVIEW OF SYSTEMS
[Loss Of Hearing] : hearing loss [see HPI] : see HPI [Joint Pain] : joint pain [Anxiety] : anxiety [Negative] : Heme/Lymph [Fever] : no fever [Recent Weight Gain (___ Lbs)] : no recent weight gain [Chills] : no chills [Recent Weight Loss (___ Lbs)] : no recent weight loss [Dyspnea on exertion] : not dyspnea during exertion [Chest  Pressure] : no chest pressure [Chest Pain] : no chest pain [Lower Ext Edema] : lower extremity edema [Leg Claudication] : no intermittent leg claudication [Palpitations] : no palpitations [Cough] : no cough [Wheezing] : no wheezing [Coughing Up Blood] : no hemoptysis [Abdominal Pain] : no abdominal pain [Nausea] : no nausea [Heartburn] : no heartburn [Change in Appetite] : no change in appetite [Change In The Stool] : no change in stool [Dysuria] : no dysuria [Dizziness] : no dizziness

## 2019-12-05 NOTE — REASON FOR VISIT
[FreeTextEntry1] : The patient is a 79-year-old woman with a history of hypertension, hypercholesterolemia, and carotid stenosis for urgent visit

## 2019-12-06 ENCOUNTER — APPOINTMENT (OUTPATIENT)
Dept: CARDIOLOGY | Facility: CLINIC | Age: 79
End: 2019-12-06

## 2019-12-09 ENCOUNTER — MEDICATION RENEWAL (OUTPATIENT)
Age: 79
End: 2019-12-09

## 2020-01-07 ENCOUNTER — RX RENEWAL (OUTPATIENT)
Age: 80
End: 2020-01-07

## 2020-02-26 ENCOUNTER — RX RENEWAL (OUTPATIENT)
Age: 80
End: 2020-02-26

## 2020-03-18 ENCOUNTER — RX RENEWAL (OUTPATIENT)
Age: 80
End: 2020-03-18

## 2020-04-22 ENCOUNTER — RX RENEWAL (OUTPATIENT)
Age: 80
End: 2020-04-22

## 2020-06-09 ENCOUNTER — RX RENEWAL (OUTPATIENT)
Age: 80
End: 2020-06-09

## 2020-07-20 ENCOUNTER — RX RENEWAL (OUTPATIENT)
Age: 80
End: 2020-07-20

## 2020-09-24 ENCOUNTER — NON-APPOINTMENT (OUTPATIENT)
Age: 80
End: 2020-09-24

## 2020-10-09 ENCOUNTER — RX RENEWAL (OUTPATIENT)
Age: 80
End: 2020-10-09

## 2020-10-26 ENCOUNTER — APPOINTMENT (OUTPATIENT)
Dept: CARDIOLOGY | Facility: CLINIC | Age: 80
End: 2020-10-26
Payer: MEDICARE

## 2020-10-26 ENCOUNTER — NON-APPOINTMENT (OUTPATIENT)
Age: 80
End: 2020-10-26

## 2020-10-26 VITALS
HEART RATE: 66 BPM | DIASTOLIC BLOOD PRESSURE: 74 MMHG | OXYGEN SATURATION: 100 % | HEIGHT: 58 IN | BODY MASS INDEX: 30.86 KG/M2 | WEIGHT: 147 LBS | SYSTOLIC BLOOD PRESSURE: 172 MMHG | RESPIRATION RATE: 17 BRPM | TEMPERATURE: 97.3 F

## 2020-10-26 VITALS — HEART RATE: 64 BPM | SYSTOLIC BLOOD PRESSURE: 190 MMHG | DIASTOLIC BLOOD PRESSURE: 85 MMHG

## 2020-10-26 DIAGNOSIS — Z23 ENCOUNTER FOR IMMUNIZATION: ICD-10-CM

## 2020-10-26 DIAGNOSIS — H61.23 IMPACTED CERUMEN, BILATERAL: ICD-10-CM

## 2020-10-26 DIAGNOSIS — Z00.00 ENCOUNTER FOR GENERAL ADULT MEDICAL EXAMINATION W/OUT ABNORMAL FINDINGS: ICD-10-CM

## 2020-10-26 PROCEDURE — 90662 IIV NO PRSV INCREASED AG IM: CPT

## 2020-10-26 PROCEDURE — 93000 ELECTROCARDIOGRAM COMPLETE: CPT

## 2020-10-26 PROCEDURE — G0008: CPT

## 2020-10-26 PROCEDURE — 99215 OFFICE O/P EST HI 40 MIN: CPT | Mod: 25

## 2020-10-26 PROCEDURE — 36415 COLL VENOUS BLD VENIPUNCTURE: CPT

## 2020-10-26 NOTE — HISTORY OF PRESENT ILLNESS
[FreeTextEntry1] : June 20, 2017. 77-year-old woman with diabetes mellitus, hyperlipidemia, hypertension who continues to complain of palpitations when she is stressed. She feels her heart being fast. It lasts for a minute or 2. The palpitations then gradually slowed down. She has no associated symptoms. The patient denies exertional dyspnea or chest pressure. She has no lightheadedness or ankle swelling.\par October 30, 2017. Saw Dr. Rosenstein for blurred vision suddenly (not amaurosis). Normal carotid Doppler 10/26/2017. He noted skipped beats or irregular pulse. Here now, feels fine except occasional  rapid beats. In sinus but occasional PC's on exam, with sx. Recent labs with Cholesterol 161, TG 65, HDL 74, LDL 73. HbA1c 5.8. Normal TFTs. Previous echo with minimal MR, AS?AI. Holter with only symptomatic VPCs.\par August 27, 2018. Here for concern over new edema left leg. Not tender,red, hot, etc. No trauma but has arthritis in that knee and recent failed steroid injection. Concerned it's her heart. Workup negative for cardiac and venous Doppler negative for DVT, but positive for Bakers cyst. It was drained by radiology.\par November 30, 2018. Patient here in followup. Had labs October 5, with normal CMP, fasting glucose 98, cholesterol 178, HDL 84, LDL 80. Normal TSH, normal CBC. Asked about baby aspirin, which she has been taking for years. Had negative carotid Dopplers a few years ago, with no evidence of plaque by neurology. No indication for aspirin, so she will stop it. Complained about trouble sleeping, and once tried Ambien from her internist, and didn't like it, but she will followup with the internist.\par December 26, 2018. Patient called earlier today. She had chest pain over Jules day and is concerned. She's also had worsening of her edema and was told to stop her steroids.\par EKG here is sinus rhythm with APCs, but otherwise within normal limits. \par Actually had symptoms of cough, and her internist gave her both Tamiflu and c and then subsequently gave her a Medrol pack, possibly twice She is better than initially, but still has symptoms of shortness of breath with exertion, especially when she tried to walk. Both her dogs. The other night, and concerned about the edema in her legs. Still has productive cough as well and is concerned about being able to make her flight to [place] on January 6. She also cannot hear out of her left ear because it is clogged\par May 2019. Patient called from [place], anxious that her blood pressure is always in the 140s to 150s over 70. Reassured her but then increased her verapamil from 180 q.12 h. to 240 q.12 h.\par July 29, 2019. Patient here in followup. Remains with sinus bradycardia at 58 with an otherwise normal ECG and no evidence of LVH. Outside readings of her blood pressure, mostly in the 130s occasionally even 120/60-70. She was told by the doctors in [place] of extra beats, but she never notices them. No other complaints except trouble sleeping and needing zolpidem.\par November 7, 2019.  Patient returns in follow-up.  EKG remains in sinus rhythm 60 and unchanged.  Good spirits feeling well blood pressure excellent EKG within normal limits.  Her internist took a CBC along with a sed rate because of her previous history of PMR but all was acceptable.  Her orthopedist wants her to have a vitamin D level and a calcium level because of her history of osteoporosis.  She is a little confused as to whether to remain on extra calcium; she takes 500 with vitamin D.  \par December 5, 2019.  Patient called this morning and came in very upset because of edema of her right leg.  Last week she developed the edema and her internist put her on torsemide 20 mg and potassium 20 mEq.  After 3 days she became very lightheaded and dizzy.  He told her to stop it and then when it return he told her to resume and then cut it in half.  She came in today because she is very worried.  It is obvious that the edema is not related to heart failure and it is very mild.  Torsemide is fairly strong.  She had issues with swelling in the other leg about a year ago when she has had recent injections in her right knee for arthritis and a work-up for possible recurrence of PMR despite a normal sed rate.  There is no shortness of breath PND orthopnea chest pain or change in her other status.\par October 26, 2020.  First visit since December of last year.  Remains in sinus rhythm at 62 with a normal ECG other than poor R wave progression.  Her PMR came back and she is having trouble getting off the prednisone; now on 5 mg daily.  This is closed her blood pressure to be elevated along with the stress from COVID-19 and she is very nervous because she has risk factors.  She asked to have both her sed rate and her blood type checked today.  Flu shot given.

## 2020-10-26 NOTE — REASON FOR VISIT
[FreeTextEntry1] : The patient is a 80-year-old woman with a history of hypertension, hypercholesterolemia, and carotid stenosis for follow up visit

## 2020-10-26 NOTE — REVIEW OF SYSTEMS
[Loss Of Hearing] : hearing loss [Lower Ext Edema] : lower extremity edema [see HPI] : see HPI [Joint Pain] : joint pain [Anxiety] : anxiety [Negative] : Heme/Lymph [Fever] : no fever [Recent Weight Gain (___ Lbs)] : no recent weight gain [Chills] : no chills [Recent Weight Loss (___ Lbs)] : recent [unfilled] ~Ulb weight loss [Dyspnea on exertion] : not dyspnea during exertion [Chest  Pressure] : no chest pressure [Chest Pain] : no chest pain [Leg Claudication] : no intermittent leg claudication [Palpitations] : no palpitations [Cough] : no cough [Wheezing] : no wheezing [Coughing Up Blood] : no hemoptysis [Abdominal Pain] : no abdominal pain [Nausea] : no nausea [Heartburn] : no heartburn [Change in Appetite] : no change in appetite [Change In The Stool] : no change in stool [Dysuria] : no dysuria [Dizziness] : no dizziness

## 2020-10-27 LAB
ABO + RH PNL BLD: NORMAL
ALBUMIN SERPL ELPH-MCNC: 4.7 G/DL
ALP BLD-CCNC: 82 U/L
ALT SERPL-CCNC: 19 U/L
ANION GAP SERPL CALC-SCNC: 13 MMOL/L
AST SERPL-CCNC: 15 U/L
BASOPHILS # BLD AUTO: 0.08 K/UL
BASOPHILS NFR BLD AUTO: 0.9 %
BILIRUB SERPL-MCNC: 0.7 MG/DL
BUN SERPL-MCNC: 15 MG/DL
CALCIUM SERPL-MCNC: 10.4 MG/DL
CHLORIDE SERPL-SCNC: 96 MMOL/L
CHOLEST SERPL-MCNC: 171 MG/DL
CO2 SERPL-SCNC: 28 MMOL/L
CREAT SERPL-MCNC: 0.49 MG/DL
EOSINOPHIL # BLD AUTO: 0.06 K/UL
EOSINOPHIL NFR BLD AUTO: 0.7 %
ERYTHROCYTE [SEDIMENTATION RATE] IN BLOOD BY WESTERGREN METHOD: 30 MM/HR
ESTIMATED AVERAGE GLUCOSE: 123 MG/DL
GLUCOSE SERPL-MCNC: 94 MG/DL
HBA1C MFR BLD HPLC: 5.9 %
HCT VFR BLD CALC: 41.8 %
HDLC SERPL-MCNC: 80 MG/DL
HGB BLD-MCNC: 13.4 G/DL
IMM GRANULOCYTES NFR BLD AUTO: 0.6 %
LDLC SERPL CALC-MCNC: 74 MG/DL
LYMPHOCYTES # BLD AUTO: 1.66 K/UL
LYMPHOCYTES NFR BLD AUTO: 18.5 %
MAN DIFF?: NORMAL
MCHC RBC-ENTMCNC: 30.8 PG
MCHC RBC-ENTMCNC: 32.1 GM/DL
MCV RBC AUTO: 96.1 FL
MONOCYTES # BLD AUTO: 0.93 K/UL
MONOCYTES NFR BLD AUTO: 10.4 %
NEUTROPHILS # BLD AUTO: 6.18 K/UL
NEUTROPHILS NFR BLD AUTO: 68.9 %
NONHDLC SERPL-MCNC: 91 MG/DL
NT-PROBNP SERPL-MCNC: 93 PG/ML
PLATELET # BLD AUTO: 284 K/UL
POTASSIUM SERPL-SCNC: 4.4 MMOL/L
PROT SERPL-MCNC: 6.9 G/DL
RBC # BLD: 4.35 M/UL
RBC # FLD: 12.5 %
SARS-COV-2 IGG SERPL IA-ACNC: <0.1 INDEX
SARS-COV-2 IGG SERPL QL IA: NEGATIVE
SODIUM SERPL-SCNC: 136 MMOL/L
TRIGL SERPL-MCNC: 88 MG/DL
TSH SERPL-ACNC: 1.95 UIU/ML
WBC # FLD AUTO: 8.96 K/UL

## 2020-11-25 ENCOUNTER — APPOINTMENT (OUTPATIENT)
Dept: CARDIOLOGY | Facility: CLINIC | Age: 80
End: 2020-11-25
Payer: MEDICARE

## 2020-11-25 VITALS — DIASTOLIC BLOOD PRESSURE: 88 MMHG | SYSTOLIC BLOOD PRESSURE: 160 MMHG

## 2020-11-25 VITALS
OXYGEN SATURATION: 97 % | HEART RATE: 73 BPM | DIASTOLIC BLOOD PRESSURE: 88 MMHG | SYSTOLIC BLOOD PRESSURE: 170 MMHG | BODY MASS INDEX: 31.14 KG/M2 | WEIGHT: 149 LBS | TEMPERATURE: 98.2 F

## 2020-11-25 VITALS — HEART RATE: 72 BPM | DIASTOLIC BLOOD PRESSURE: 82 MMHG | SYSTOLIC BLOOD PRESSURE: 160 MMHG

## 2020-11-25 PROCEDURE — 36415 COLL VENOUS BLD VENIPUNCTURE: CPT

## 2020-11-25 PROCEDURE — 99213 OFFICE O/P EST LOW 20 MIN: CPT

## 2020-11-25 RX ORDER — LOSARTAN POTASSIUM 100 MG/1
100 TABLET, FILM COATED ORAL DAILY
Qty: 90 | Refills: 1 | Status: DISCONTINUED | COMMUNITY
Start: 2019-11-11 | End: 2020-11-25

## 2020-11-25 RX ORDER — HYDROCHLOROTHIAZIDE 25 MG/1
25 TABLET ORAL DAILY
Qty: 90 | Refills: 1 | Status: DISCONTINUED | COMMUNITY
Start: 2019-11-11 | End: 2020-11-25

## 2020-11-25 NOTE — REVIEW OF SYSTEMS
[Fever] : no fever [Recent Weight Gain (___ Lbs)] : no recent weight gain [Chills] : no chills [Recent Weight Loss (___ Lbs)] : no recent weight loss [Loss Of Hearing] : hearing loss [Dyspnea on exertion] : not dyspnea during exertion [Chest  Pressure] : no chest pressure [Chest Pain] : no chest pain [Lower Ext Edema] : lower extremity edema [Leg Claudication] : no intermittent leg claudication [Palpitations] : no palpitations [Cough] : no cough [Wheezing] : no wheezing [Coughing Up Blood] : no hemoptysis [Abdominal Pain] : no abdominal pain [Nausea] : no nausea [Change in Appetite] : no change in appetite [Change In The Stool] : no change in stool [Heartburn] : heartburn [Dysuria] : no dysuria [see HPI] : see HPI [Joint Pain] : joint pain [Dizziness] : no dizziness [Anxiety] : anxiety [Negative] : Heme/Lymph

## 2020-11-25 NOTE — REASON FOR VISIT
[FreeTextEntry1] : The patient is a 80-year-old woman with a history of hypertension, hypercholesterolemia, and carotid stenosis for follow up visit still with PMR

## 2020-11-25 NOTE — HISTORY OF PRESENT ILLNESS
[FreeTextEntry1] : June 20, 2017. 77-year-old woman with diabetes mellitus, hyperlipidemia, hypertension who continues to complain of palpitations when she is stressed. She feels her heart being fast. It lasts for a minute or 2. The palpitations then gradually slowed down. She has no associated symptoms. The patient denies exertional dyspnea or chest pressure. She has no lightheadedness or ankle swelling.\par October 30, 2017. Saw Dr. Rosenstein for blurred vision suddenly (not amaurosis). Normal carotid Doppler 10/26/2017. He noted skipped beats or irregular pulse. Here now, feels fine except occasional  rapid beats. In sinus but occasional PC's on exam, with sx. Recent labs with Cholesterol 161, TG 65, HDL 74, LDL 73. HbA1c 5.8. Normal TFTs. Previous echo with minimal MR, AS?AI. Holter with only symptomatic VPCs.\par August 27, 2018. Here for concern over new edema left leg. Not tender,red, hot, etc. No trauma but has arthritis in that knee and recent failed steroid injection. Concerned it's her heart. Workup negative for cardiac and venous Doppler negative for DVT, but positive for Bakers cyst. It was drained by radiology.\par November 30, 2018. Patient here in followup. Had labs October 5, with normal CMP, fasting glucose 98, cholesterol 178, HDL 84, LDL 80. Normal TSH, normal CBC. Asked about baby aspirin, which she has been taking for years. Had negative carotid Dopplers a few years ago, with no evidence of plaque by neurology. No indication for aspirin, so she will stop it. Complained about trouble sleeping, and once tried Ambien from her internist, and didn't like it, but she will followup with the internist.\par December 26, 2018. Patient called earlier today. She had chest pain over Jules day and is concerned. She's also had worsening of her edema and was told to stop her steroids.\par EKG here is sinus rhythm with APCs, but otherwise within normal limits. \par Actually had symptoms of cough, and her internist gave her both Tamiflu and c and then subsequently gave her a Medrol pack, possibly twice She is better than initially, but still has symptoms of shortness of breath with exertion, especially when she tried to walk. Both her dogs. The other night, and concerned about the edema in her legs. Still has productive cough as well and is concerned about being able to make her flight to [place] on January 6. She also cannot hear out of her left ear because it is clogged\par May 2019. Patient called from [place], anxious that her blood pressure is always in the 140s to 150s over 70. Reassured her but then increased her verapamil from 180 q.12 h. to 240 q.12 h.\par July 29, 2019. Patient here in followup. Remains with sinus bradycardia at 58 with an otherwise normal ECG and no evidence of LVH. Outside readings of her blood pressure, mostly in the 130s occasionally even 120/60-70. She was told by the doctors in [place] of extra beats, but she never notices them. No other complaints except trouble sleeping and needing zolpidem.\par November 7, 2019.  Patient returns in follow-up.  EKG remains in sinus rhythm 60 and unchanged.  Good spirits feeling well blood pressure excellent EKG within normal limits.  Her internist took a CBC along with a sed rate because of her previous history of PMR but all was acceptable.  Her orthopedist wants her to have a vitamin D level and a calcium level because of her history of osteoporosis.  She is a little confused as to whether to remain on extra calcium; she takes 500 with vitamin D.  \par December 5, 2019.  Patient called this morning and came in very upset because of edema of her right leg.  Last week she developed the edema and her internist put her on torsemide 20 mg and potassium 20 mEq.  After 3 days she became very lightheaded and dizzy.  He told her to stop it and then when it return he told her to resume and then cut it in half.  She came in today because she is very worried.  It is obvious that the edema is not related to heart failure and it is very mild.  Torsemide is fairly strong.  She had issues with swelling in the other leg about a year ago when she has had recent injections in her right knee for arthritis and a work-up for possible recurrence of PMR despite a normal sed rate.  There is no shortness of breath PND orthopnea chest pain or change in her other status.\par October 26, 2020.  First visit since December of last year.  Remains in sinus rhythm at 62 with a normal ECG other than poor R wave progression.  Her PMR came back and she is having trouble getting off the prednisone; now on 5 mg daily.  This is closed her blood pressure to be elevated along with the stress from COVID-19 and she is very nervous because she has risk factors.  She asked to have both her sed rate and her blood type checked today.  Flu shot given.\par November 25, 2020.  Patient saw her internist yesterday to get her ears cleaned out and her blood pressure was still high.  She also has some swelling in both shins.  Staying on prednisone 5 mg at least until January when she is supposed to go down to 2.5.  Tolerating the candesartan HCT and no longer has rapid heart racing.  Is on furosemide 20 mg as well and still on verapamil.  No beta-blockers because of her asthma.

## 2020-11-27 LAB
ALBUMIN SERPL ELPH-MCNC: 4.4 G/DL
ALP BLD-CCNC: 61 U/L
ALT SERPL-CCNC: 23 U/L
ANION GAP SERPL CALC-SCNC: 13 MMOL/L
AST SERPL-CCNC: 17 U/L
BASOPHILS # BLD AUTO: 0.05 K/UL
BASOPHILS NFR BLD AUTO: 0.5 %
BILIRUB SERPL-MCNC: 0.5 MG/DL
BUN SERPL-MCNC: 24 MG/DL
CALCIUM SERPL-MCNC: 10.1 MG/DL
CHLORIDE SERPL-SCNC: 96 MMOL/L
CO2 SERPL-SCNC: 28 MMOL/L
CREAT SERPL-MCNC: 0.51 MG/DL
EOSINOPHIL # BLD AUTO: 0.04 K/UL
EOSINOPHIL NFR BLD AUTO: 0.4 %
HCT VFR BLD CALC: 41 %
HGB BLD-MCNC: 12.7 G/DL
IMM GRANULOCYTES NFR BLD AUTO: 0.7 %
LYMPHOCYTES # BLD AUTO: 1.1 K/UL
LYMPHOCYTES NFR BLD AUTO: 10.5 %
MAN DIFF?: NORMAL
MCHC RBC-ENTMCNC: 30.5 PG
MCHC RBC-ENTMCNC: 31 GM/DL
MCV RBC AUTO: 98.3 FL
MONOCYTES # BLD AUTO: 0.77 K/UL
MONOCYTES NFR BLD AUTO: 7.4 %
NEUTROPHILS # BLD AUTO: 8.41 K/UL
NEUTROPHILS NFR BLD AUTO: 80.5 %
PLATELET # BLD AUTO: 214 K/UL
POTASSIUM SERPL-SCNC: 4.2 MMOL/L
PROT SERPL-MCNC: 6.7 G/DL
RBC # BLD: 4.17 M/UL
RBC # FLD: 13.4 %
SODIUM SERPL-SCNC: 136 MMOL/L
WBC # FLD AUTO: 10.44 K/UL

## 2021-01-22 ENCOUNTER — RX RENEWAL (OUTPATIENT)
Age: 81
End: 2021-01-22

## 2021-02-09 ENCOUNTER — RX RENEWAL (OUTPATIENT)
Age: 81
End: 2021-02-09

## 2021-05-04 ENCOUNTER — RX RENEWAL (OUTPATIENT)
Age: 81
End: 2021-05-04

## 2021-05-08 ENCOUNTER — TRANSCRIPTION ENCOUNTER (OUTPATIENT)
Age: 81
End: 2021-05-08

## 2021-05-12 ENCOUNTER — NON-APPOINTMENT (OUTPATIENT)
Age: 81
End: 2021-05-12

## 2021-05-12 ENCOUNTER — APPOINTMENT (OUTPATIENT)
Dept: CARDIOLOGY | Facility: CLINIC | Age: 81
End: 2021-05-12
Payer: MEDICARE

## 2021-05-12 VITALS — SYSTOLIC BLOOD PRESSURE: 150 MMHG | DIASTOLIC BLOOD PRESSURE: 68 MMHG | HEART RATE: 64 BPM

## 2021-05-12 VITALS
WEIGHT: 150 LBS | OXYGEN SATURATION: 98 % | BODY MASS INDEX: 31.49 KG/M2 | SYSTOLIC BLOOD PRESSURE: 134 MMHG | DIASTOLIC BLOOD PRESSURE: 72 MMHG | HEART RATE: 59 BPM | HEIGHT: 58 IN | RESPIRATION RATE: 17 BRPM | TEMPERATURE: 97.9 F

## 2021-05-12 DIAGNOSIS — K21.9 GASTRO-ESOPHAGEAL REFLUX DISEASE W/OUT ESOPHAGITIS: ICD-10-CM

## 2021-05-12 PROCEDURE — 36415 COLL VENOUS BLD VENIPUNCTURE: CPT

## 2021-05-12 PROCEDURE — 93000 ELECTROCARDIOGRAM COMPLETE: CPT

## 2021-05-12 PROCEDURE — 99214 OFFICE O/P EST MOD 30 MIN: CPT

## 2021-05-12 NOTE — REASON FOR VISIT
[FreeTextEntry1] : The patient is a 81-year-old woman with a history of hypertension, hypercholesterolemia, and carotid stenosis for follow up visit still with PMR

## 2021-05-12 NOTE — PHYSICAL EXAM
[General Appearance - Well Developed] : well developed [Normal Appearance] : normal appearance [Well Groomed] : well groomed [General Appearance - Well Nourished] : well nourished [No Deformities] : no deformities [General Appearance - In No Acute Distress] : no acute distress [Normal Conjunctiva] : the conjunctiva exhibited no abnormalities [Eyelids - No Xanthelasma] : the eyelids demonstrated no xanthelasmas [Normal Oral Mucosa] : normal oral mucosa [No Oral Pallor] : no oral pallor [No Oral Cyanosis] : no oral cyanosis [Normal Jugular Venous A Waves Present] : normal jugular venous A waves present [Normal Jugular Venous V Waves Present] : normal jugular venous V waves present [No Jugular Venous Cuevas A Waves] : no jugular venous cuevas A waves [Respiration, Rhythm And Depth] : normal respiratory rhythm and effort [Exaggerated Use Of Accessory Muscles For Inspiration] : no accessory muscle use [Auscultation Breath Sounds / Voice Sounds] : lungs were clear to auscultation bilaterally [Heart Rate And Rhythm] : heart rate and rhythm were normal [Heart Sounds] : normal S1 and S2 [Murmurs] : no murmurs present [Abdomen Soft] : soft [Abdomen Tenderness] : non-tender [Abdomen Mass (___ Cm)] : no abdominal mass palpated [Abnormal Walk] : normal gait [Gait - Sufficient For Exercise Testing] : the gait was sufficient for exercise testing [Nail Clubbing] : no clubbing of the fingernails [Cyanosis, Localized] : no localized cyanosis [Petechial Hemorrhages (___cm)] : no petechial hemorrhages [Skin Color & Pigmentation] : normal skin color and pigmentation [] : no rash [No Venous Stasis] : no venous stasis [Skin Lesions] : no skin lesions [No Skin Ulcers] : no skin ulcer [No Xanthoma] : no  xanthoma was observed [Oriented To Time, Place, And Person] : oriented to person, place, and time [Mood] : the mood was normal [Affect] : the affect was normal [FreeTextEntry1] : anxious

## 2021-05-12 NOTE — HISTORY OF PRESENT ILLNESS
[FreeTextEntry1] : June 20, 2017. 77-year-old woman with diabetes mellitus, hyperlipidemia, hypertension who continues to complain of palpitations when she is stressed. She feels her heart being fast. It lasts for a minute or 2. The palpitations then gradually slowed down. She has no associated symptoms. The patient denies exertional dyspnea or chest pressure. She has no lightheadedness or ankle swelling.\par October 30, 2017. Saw Dr. Rosenstein for blurred vision suddenly (not amaurosis). Normal carotid Doppler 10/26/2017. He noted skipped beats or irregular pulse. Here now, feels fine except occasional  rapid beats. In sinus but occasional PC's on exam, with sx. Recent labs with Cholesterol 161, TG 65, HDL 74, LDL 73. HbA1c 5.8. Normal TFTs. Previous echo with minimal MR, AS?AI. Holter with only symptomatic VPCs.\par August 27, 2018. Here for concern over new edema left leg. Not tender,red, hot, etc. No trauma but has arthritis in that knee and recent failed steroid injection. Concerned it's her heart. Workup negative for cardiac and venous Doppler negative for DVT, but positive for Bakers cyst. It was drained by radiology.\par November 30, 2018. Patient here in followup. Had labs October 5, with normal CMP, fasting glucose 98, cholesterol 178, HDL 84, LDL 80. Normal TSH, normal CBC. Asked about baby aspirin, which she has been taking for years. Had negative carotid Dopplers a few years ago, with no evidence of plaque by neurology. No indication for aspirin, so she will stop it. Complained about trouble sleeping, and once tried Ambien from her internist, and didn't like it, but she will followup with the internist.\par December 26, 2018. Patient called earlier today. She had chest pain over Jules day and is concerned. She's also had worsening of her edema and was told to stop her steroids.\par EKG here is sinus rhythm with APCs, but otherwise within normal limits. \par Actually had symptoms of cough, and her internist gave her both Tamiflu and c and then subsequently gave her a Medrol pack, possibly twice She is better than initially, but still has symptoms of shortness of breath with exertion, especially when she tried to walk. Both her dogs. The other night, and concerned about the edema in her legs. Still has productive cough as well and is concerned about being able to make her flight to [place] on January 6. She also cannot hear out of her left ear because it is clogged\par May 2019. Patient called from [place], anxious that her blood pressure is always in the 140s to 150s over 70. Reassured her but then increased her verapamil from 180 q.12 h. to 240 q.12 h.\par July 29, 2019. Patient here in followup. Remains with sinus bradycardia at 58 with an otherwise normal ECG and no evidence of LVH. Outside readings of her blood pressure, mostly in the 130s occasionally even 120/60-70. She was told by the doctors in [place] of extra beats, but she never notices them. No other complaints except trouble sleeping and needing zolpidem.\par November 7, 2019.  Patient returns in follow-up.  EKG remains in sinus rhythm 60 and unchanged.  Good spirits feeling well blood pressure excellent EKG within normal limits.  Her internist took a CBC along with a sed rate because of her previous history of PMR but all was acceptable.  Her orthopedist wants her to have a vitamin D level and a calcium level because of her history of osteoporosis.  She is a little confused as to whether to remain on extra calcium; she takes 500 with vitamin D.  \par December 5, 2019.  Patient called this morning and came in very upset because of edema of her right leg.  Last week she developed the edema and her internist put her on torsemide 20 mg and potassium 20 mEq.  After 3 days she became very lightheaded and dizzy.  He told her to stop it and then when it return he told her to resume and then cut it in half.  She came in today because she is very worried.  It is obvious that the edema is not related to heart failure and it is very mild.  Torsemide is fairly strong.  She had issues with swelling in the other leg about a year ago when she has had recent injections in her right knee for arthritis and a work-up for possible recurrence of PMR despite a normal sed rate.  There is no shortness of breath PND orthopnea chest pain or change in her other status.\par October 26, 2020.  First visit since December of last year.  Remains in sinus rhythm at 62 with a normal ECG other than poor R wave progression.  Her PMR came back and she is having trouble getting off the prednisone; now on 5 mg daily.  This is closed her blood pressure to be elevated along with the stress from COVID-19 and she is very nervous because she has risk factors.  She asked to have both her sed rate and her blood type checked today.  Flu shot given.\par November 25, 2020.  Patient saw her internist yesterday to get her ears cleaned out and her blood pressure was still high.  She also has some swelling in both shins.  Staying on prednisone 5 mg at least until January when she is supposed to go down to 2.5.  Tolerating the candesartan HCT and no longer has rapid heart racing.  Is on furosemide 20 mg as well and still on verapamil.  No beta-blockers because of her asthma.\par Reviewed labs with patient.  She claims her blood pressure is down to 130 and 135.  She is also down to 2.5 mg on the prednisone.  \par May 12, 2021.  Patient here in follow-up.  Remains in sinus rhythm at 57 with a normal ECG.  Blood pressure here 134/72 on the left arm (she tends to be about 10 points higher on the right.  However she says when she checks her pressure at home she usually checks the left arm and her numbers at home are always high).  She is now on omeprazole because she gets heartburn possibly from her prednisone as she is still on 2.5 mg daily.  She claims if she walks even just a little she is out of breath but no chest pressure.  She is seeing pulmonary tomorrow and I told her I will want her to come back for an echocardiogram as well.  Whether she needs some type of stress test remains to be seen.  She is now on some Lexapro because she got very depressed over Covid.  She is fully vaccinated.  She started T[employer] Frank diet 2 weeks ago.

## 2021-05-12 NOTE — REVIEW OF SYSTEMS
[Weight Gain (___ Lbs)] : [unfilled] ~Ulb weight gain [Dyspnea on exertion] : dyspnea during exertion [Chest Discomfort] : no chest discomfort [Lower Ext Edema] : no extremity edema [Orthopnea] : no orthopnea [PND] : no PND [Cough] : no cough [Wheezing] : no wheezing [Heartburn] : heartburn [Change in Appetite] : no change in appetite [Constipation] : no constipation [Blood in Stool] : no blood in stool [Negative] : Heme/Lymph [FreeTextEntry6] : MUNROE [FreeTextEntry9] : PMR (once her sed rate repeated)

## 2021-05-13 LAB
ALBUMIN SERPL ELPH-MCNC: 4.4 G/DL
ALP BLD-CCNC: 78 U/L
ALT SERPL-CCNC: 18 U/L
ANION GAP SERPL CALC-SCNC: 11 MMOL/L
AST SERPL-CCNC: 19 U/L
BASOPHILS # BLD AUTO: 0.06 K/UL
BASOPHILS NFR BLD AUTO: 0.6 %
BILIRUB SERPL-MCNC: 0.5 MG/DL
BUN SERPL-MCNC: 13 MG/DL
CALCIUM SERPL-MCNC: 9.8 MG/DL
CHLORIDE SERPL-SCNC: 94 MMOL/L
CHOLEST SERPL-MCNC: 162 MG/DL
CO2 SERPL-SCNC: 26 MMOL/L
CREAT SERPL-MCNC: 0.54 MG/DL
CRP SERPL-MCNC: 6 MG/L
EOSINOPHIL # BLD AUTO: 0.03 K/UL
EOSINOPHIL NFR BLD AUTO: 0.3 %
ERYTHROCYTE [SEDIMENTATION RATE] IN BLOOD BY WESTERGREN METHOD: 23 MM/HR
ESTIMATED AVERAGE GLUCOSE: 120 MG/DL
GLUCOSE SERPL-MCNC: 104 MG/DL
HBA1C MFR BLD HPLC: 5.8 %
HCT VFR BLD CALC: 39.5 %
HDLC SERPL-MCNC: 68 MG/DL
HGB BLD-MCNC: 12.5 G/DL
IMM GRANULOCYTES NFR BLD AUTO: 0.4 %
LDLC SERPL CALC-MCNC: 78 MG/DL
LYMPHOCYTES # BLD AUTO: 0.87 K/UL
LYMPHOCYTES NFR BLD AUTO: 9.1 %
MAN DIFF?: NORMAL
MCHC RBC-ENTMCNC: 30.3 PG
MCHC RBC-ENTMCNC: 31.6 GM/DL
MCV RBC AUTO: 95.6 FL
MONOCYTES # BLD AUTO: 0.83 K/UL
MONOCYTES NFR BLD AUTO: 8.7 %
NEUTROPHILS # BLD AUTO: 7.73 K/UL
NEUTROPHILS NFR BLD AUTO: 80.9 %
NONHDLC SERPL-MCNC: 94 MG/DL
NT-PROBNP SERPL-MCNC: 105 PG/ML
PLATELET # BLD AUTO: 239 K/UL
POTASSIUM SERPL-SCNC: 4.4 MMOL/L
PROT SERPL-MCNC: 6.5 G/DL
RBC # BLD: 4.13 M/UL
RBC # FLD: 13.6 %
SODIUM SERPL-SCNC: 131 MMOL/L
TRIGL SERPL-MCNC: 80 MG/DL
TSH SERPL-ACNC: 1.5 UIU/ML
WBC # FLD AUTO: 9.56 K/UL

## 2021-05-21 ENCOUNTER — RX RENEWAL (OUTPATIENT)
Age: 81
End: 2021-05-21

## 2021-06-18 ENCOUNTER — APPOINTMENT (OUTPATIENT)
Dept: CARDIOLOGY | Facility: CLINIC | Age: 81
End: 2021-06-18

## 2021-06-24 ENCOUNTER — NON-APPOINTMENT (OUTPATIENT)
Age: 81
End: 2021-06-24

## 2021-06-24 ENCOUNTER — APPOINTMENT (OUTPATIENT)
Dept: CARDIOLOGY | Facility: CLINIC | Age: 81
End: 2021-06-24
Payer: MEDICARE

## 2021-06-24 VITALS
TEMPERATURE: 96.9 F | SYSTOLIC BLOOD PRESSURE: 132 MMHG | HEART RATE: 64 BPM | BODY MASS INDEX: 32.19 KG/M2 | DIASTOLIC BLOOD PRESSURE: 60 MMHG | OXYGEN SATURATION: 96 % | WEIGHT: 154 LBS

## 2021-06-24 DIAGNOSIS — I65.29 OCCLUSION AND STENOSIS OF UNSPECIFIED CAROTID ARTERY: ICD-10-CM

## 2021-06-24 PROCEDURE — 36415 COLL VENOUS BLD VENIPUNCTURE: CPT

## 2021-06-24 PROCEDURE — 93000 ELECTROCARDIOGRAM COMPLETE: CPT

## 2021-06-24 PROCEDURE — 93306 TTE W/DOPPLER COMPLETE: CPT

## 2021-06-24 PROCEDURE — 99214 OFFICE O/P EST MOD 30 MIN: CPT | Mod: 25

## 2021-06-24 NOTE — REVIEW OF SYSTEMS
[Dyspnea on exertion] : dyspnea during exertion [Heartburn] : heartburn [Negative] : Heme/Lymph [Weight Gain (___ Lbs)] : [unfilled] ~Ulb weight gain [Chest Discomfort] : no chest discomfort [Lower Ext Edema] : no extremity edema [Orthopnea] : no orthopnea [PND] : no PND [Cough] : no cough [Wheezing] : no wheezing [Change in Appetite] : no change in appetite [Constipation] : no constipation [Blood in Stool] : no blood in stool [FreeTextEntry6] : MUNROE [FreeTextEntry9] : PMR (once her sed rate repeated)

## 2021-06-24 NOTE — HISTORY OF PRESENT ILLNESS
[FreeTextEntry1] : June 20, 2017. 77-year-old woman with diabetes mellitus, hyperlipidemia, hypertension who continues to complain of palpitations when she is stressed. She feels her heart being fast. It lasts for a minute or 2. The palpitations then gradually slowed down. She has no associated symptoms. The patient denies exertional dyspnea or chest pressure. She has no lightheadedness or ankle swelling.\par October 30, 2017. Saw Dr. Rosenstein for blurred vision suddenly (not amaurosis). Normal carotid Doppler 10/26/2017. He noted skipped beats or irregular pulse. Here now, feels fine except occasional  rapid beats. In sinus but occasional PC's on exam, with sx. Recent labs with Cholesterol 161, TG 65, HDL 74, LDL 73. HbA1c 5.8. Normal TFTs. Previous echo with minimal MR, AS?AI. Holter with only symptomatic VPCs.\par August 27, 2018. Here for concern over new edema left leg. Not tender,red, hot, etc. No trauma but has arthritis in that knee and recent failed steroid injection. Concerned it's her heart. Workup negative for cardiac and venous Doppler negative for DVT, but positive for Bakers cyst. It was drained by radiology.\par November 30, 2018. Patient here in followup. Had labs October 5, with normal CMP, fasting glucose 98, cholesterol 178, HDL 84, LDL 80. Normal TSH, normal CBC. Asked about baby aspirin, which she has been taking for years. Had negative carotid Dopplers a few years ago, with no evidence of plaque by neurology. No indication for aspirin, so she will stop it. Complained about trouble sleeping, and once tried Ambien from her internist, and didn't like it, but she will followup with the internist.\par December 26, 2018. Patient called earlier today. She had chest pain over Jules day and is concerned. She's also had worsening of her edema and was told to stop her steroids.\par EKG here is sinus rhythm with APCs, but otherwise within normal limits. \par Actually had symptoms of cough, and her internist gave her both Tamiflu and c and then subsequently gave her a Medrol pack, possibly twice She is better than initially, but still has symptoms of shortness of breath with exertion, especially when she tried to walk. Both her dogs. The other night, and concerned about the edema in her legs. Still has productive cough as well and is concerned about being able to make her flight to [place] on January 6. She also cannot hear out of her left ear because it is clogged\par May 2019. Patient called from [place], anxious that her blood pressure is always in the 140s to 150s over 70. Reassured her but then increased her verapamil from 180 q.12 h. to 240 q.12 h.\par July 29, 2019. Patient here in followup. Remains with sinus bradycardia at 58 with an otherwise normal ECG and no evidence of LVH. Outside readings of her blood pressure, mostly in the 130s occasionally even 120/60-70. She was told by the doctors in [place] of extra beats, but she never notices them. No other complaints except trouble sleeping and needing zolpidem.\par November 7, 2019.  Patient returns in follow-up.  EKG remains in sinus rhythm 60 and unchanged.  Good spirits feeling well blood pressure excellent EKG within normal limits.  Her internist took a CBC along with a sed rate because of her previous history of PMR but all was acceptable.  Her orthopedist wants her to have a vitamin D level and a calcium level because of her history of osteoporosis.  She is a little confused as to whether to remain on extra calcium; she takes 500 with vitamin D.  \par December 5, 2019.  Patient called this morning and came in very upset because of edema of her right leg.  Last week she developed the edema and her internist put her on torsemide 20 mg and potassium 20 mEq.  After 3 days she became very lightheaded and dizzy.  He told her to stop it and then when it return he told her to resume and then cut it in half.  She came in today because she is very worried.  It is obvious that the edema is not related to heart failure and it is very mild.  Torsemide is fairly strong.  She had issues with swelling in the other leg about a year ago when she has had recent injections in her right knee for arthritis and a work-up for possible recurrence of PMR despite a normal sed rate.  There is no shortness of breath PND orthopnea chest pain or change in her other status.\par October 26, 2020.  First visit since December of last year.  Remains in sinus rhythm at 62 with a normal ECG other than poor R wave progression.  Her PMR came back and she is having trouble getting off the prednisone; now on 5 mg daily.  This is closed her blood pressure to be elevated along with the stress from COVID-19 and she is very nervous because she has risk factors.  She asked to have both her sed rate and her blood type checked today.  Flu shot given.\par November 25, 2020.  Patient saw her internist yesterday to get her ears cleaned out and her blood pressure was still high.  She also has some swelling in both shins.  Staying on prednisone 5 mg at least until January when she is supposed to go down to 2.5.  Tolerating the candesartan HCT and no longer has rapid heart racing.  Is on furosemide 20 mg as well and still on verapamil.  No beta-blockers because of her asthma.\par Reviewed labs with patient.  She claims her blood pressure is down to 130 and 135.  She is also down to 2.5 mg on the prednisone.  \par May 12, 2021.  Patient here in follow-up.  Remains in sinus rhythm at 57 with a normal ECG.  Blood pressure here 134/72 on the left arm (she tends to be about 10 points higher on the right.  However she says when she checks her pressure at home she usually checks the left arm and her numbers at home are always high).  She is now on omeprazole because she gets heartburn possibly from her prednisone as she is still on 2.5 mg daily.  She claims if she walks even just a little she is out of breath but no chest pressure.  She is seeing pulmonary tomorrow and I told her I will want her to come back for an echocardiogram as well.  Whether she needs some type of stress test remains to be seen.  She is now on some Lexapro because she got very depressed over Covid.  She is fully vaccinated.  She started T[employer] Frank diet 2 weeks ago.\par June 24, 2021.  Patient returns for follow-up and echo.  Still complaining of shortness of breath with exertion.  She did the Tata Frank diet and stuck to every aspect of it except the exercise and she actually gained weight.  Had a full pulmonary work-up and was told everything seems okay but she is scheduled for a chest CT in 2 weeks and he wants to wait until those results are in.  She had her echo today which shows normal LV function, no significant valve disease, does not even seem to indicate significant diastolic dysfunction and her RVSP is 39 which is slightly higher only from 2016.  She will be short of breath walking outside weather level or incline but in the house she can go up and down the stairs and have no shortness of breath.  The diuretic helped her edema but she did not notice any change in her dyspnea on exertion.  She is reluctant to do any type of stress test even a pharmacologic nuclear 1 but perhaps a CTA would be worthwhile although I do not have a high suspicion of ischemic disease here.  As far as diagnosing diastolic dysfunction it would require a right heart cath which she is not willing to do and again my suspicion is low.  She was vaccinated but she understands that people on immunosuppressive's like she is for her PMR often do not make antibodies and is still at risk and she asked for her antibodies to be checked.

## 2021-06-28 LAB
ALBUMIN SERPL ELPH-MCNC: 4.2 G/DL
ALP BLD-CCNC: 75 U/L
ALT SERPL-CCNC: 16 U/L
ANION GAP SERPL CALC-SCNC: 15 MMOL/L
AST SERPL-CCNC: 19 U/L
BASOPHILS # BLD AUTO: 0.05 K/UL
BASOPHILS NFR BLD AUTO: 0.5 %
BILIRUB SERPL-MCNC: 0.3 MG/DL
BUN SERPL-MCNC: 25 MG/DL
CALCIUM SERPL-MCNC: 9.6 MG/DL
CHLORIDE SERPL-SCNC: 100 MMOL/L
CHOLEST SERPL-MCNC: 163 MG/DL
CO2 SERPL-SCNC: 25 MMOL/L
COVID-19 SPIKE DOMAIN ANTIBODY INTERPRETATION: POSITIVE
CREAT SERPL-MCNC: 0.56 MG/DL
EOSINOPHIL # BLD AUTO: 0.05 K/UL
EOSINOPHIL NFR BLD AUTO: 0.5 %
ESTIMATED AVERAGE GLUCOSE: 123 MG/DL
GLUCOSE SERPL-MCNC: 97 MG/DL
HBA1C MFR BLD HPLC: 5.9 %
HCT VFR BLD CALC: 37.8 %
HDLC SERPL-MCNC: 70 MG/DL
HGB BLD-MCNC: 12.4 G/DL
IMM GRANULOCYTES NFR BLD AUTO: 0.5 %
LDLC SERPL CALC-MCNC: 77 MG/DL
LYMPHOCYTES # BLD AUTO: 1.1 K/UL
LYMPHOCYTES NFR BLD AUTO: 11.9 %
MAN DIFF?: NORMAL
MCHC RBC-ENTMCNC: 30.5 PG
MCHC RBC-ENTMCNC: 32.8 GM/DL
MCV RBC AUTO: 92.9 FL
MONOCYTES # BLD AUTO: 0.99 K/UL
MONOCYTES NFR BLD AUTO: 10.7 %
NEUTROPHILS # BLD AUTO: 6.97 K/UL
NEUTROPHILS NFR BLD AUTO: 75.9 %
NONHDLC SERPL-MCNC: 94 MG/DL
NT-PROBNP SERPL-MCNC: 137 PG/ML
PLATELET # BLD AUTO: 252 K/UL
POTASSIUM SERPL-SCNC: 4 MMOL/L
PROT SERPL-MCNC: 6.4 G/DL
RBC # BLD: 4.07 M/UL
RBC # FLD: 13.1 %
SARS-COV-2 AB SERPL IA-ACNC: >250 U/ML
SODIUM SERPL-SCNC: 140 MMOL/L
TRIGL SERPL-MCNC: 85 MG/DL
TSH SERPL-ACNC: 1.74 UIU/ML
WBC # FLD AUTO: 9.21 K/UL

## 2021-07-02 ENCOUNTER — RX RENEWAL (OUTPATIENT)
Age: 81
End: 2021-07-02

## 2021-08-17 ENCOUNTER — RX RENEWAL (OUTPATIENT)
Age: 81
End: 2021-08-17

## 2021-09-26 ENCOUNTER — RX RENEWAL (OUTPATIENT)
Age: 81
End: 2021-09-26

## 2021-10-12 ENCOUNTER — RX RENEWAL (OUTPATIENT)
Age: 81
End: 2021-10-12

## 2021-11-13 ENCOUNTER — RX RENEWAL (OUTPATIENT)
Age: 81
End: 2021-11-13

## 2021-12-01 ENCOUNTER — RX RENEWAL (OUTPATIENT)
Age: 81
End: 2021-12-01

## 2021-12-07 ENCOUNTER — RX RENEWAL (OUTPATIENT)
Age: 81
End: 2021-12-07

## 2021-12-16 ENCOUNTER — APPOINTMENT (OUTPATIENT)
Dept: CARDIOLOGY | Facility: CLINIC | Age: 81
End: 2021-12-16
Payer: MEDICARE

## 2021-12-16 ENCOUNTER — NON-APPOINTMENT (OUTPATIENT)
Age: 81
End: 2021-12-16

## 2021-12-16 VITALS
HEART RATE: 63 BPM | RESPIRATION RATE: 17 BRPM | SYSTOLIC BLOOD PRESSURE: 161 MMHG | OXYGEN SATURATION: 98 % | HEIGHT: 58 IN | BODY MASS INDEX: 32.75 KG/M2 | DIASTOLIC BLOOD PRESSURE: 80 MMHG | WEIGHT: 156 LBS

## 2021-12-16 VITALS — HEART RATE: 58 BPM | DIASTOLIC BLOOD PRESSURE: 80 MMHG | SYSTOLIC BLOOD PRESSURE: 146 MMHG

## 2021-12-16 DIAGNOSIS — E78.00 PURE HYPERCHOLESTEROLEMIA, UNSPECIFIED: ICD-10-CM

## 2021-12-16 PROCEDURE — 99214 OFFICE O/P EST MOD 30 MIN: CPT

## 2021-12-16 PROCEDURE — 93000 ELECTROCARDIOGRAM COMPLETE: CPT

## 2021-12-16 PROCEDURE — 36415 COLL VENOUS BLD VENIPUNCTURE: CPT

## 2021-12-16 NOTE — PHYSICAL EXAM
[General Appearance - Well Developed] : well developed [Normal Appearance] : normal appearance [Well Groomed] : well groomed [General Appearance - Well Nourished] : well nourished [No Deformities] : no deformities [General Appearance - In No Acute Distress] : no acute distress [Normal Conjunctiva] : the conjunctiva exhibited no abnormalities [Eyelids - No Xanthelasma] : the eyelids demonstrated no xanthelasmas [Normal Oral Mucosa] : normal oral mucosa [No Oral Pallor] : no oral pallor [No Oral Cyanosis] : no oral cyanosis [Normal Jugular Venous A Waves Present] : normal jugular venous A waves present [Normal Jugular Venous V Waves Present] : normal jugular venous V waves present [No Jugular Venous Cuevas A Waves] : no jugular venous cuevas A waves [Respiration, Rhythm And Depth] : normal respiratory rhythm and effort [Exaggerated Use Of Accessory Muscles For Inspiration] : no accessory muscle use [Auscultation Breath Sounds / Voice Sounds] : lungs were clear to auscultation bilaterally [Heart Rate And Rhythm] : heart rate and rhythm were normal [Heart Sounds] : normal S1 and S2 [Murmurs] : no murmurs present [Abdomen Soft] : soft [Abdomen Tenderness] : non-tender [Abdomen Mass (___ Cm)] : no abdominal mass palpated [Abnormal Walk] : normal gait [Gait - Sufficient For Exercise Testing] : the gait was sufficient for exercise testing [Nail Clubbing] : no clubbing of the fingernails [Cyanosis, Localized] : no localized cyanosis [Petechial Hemorrhages (___cm)] : no petechial hemorrhages [Skin Color & Pigmentation] : normal skin color and pigmentation [] : no rash [No Venous Stasis] : no venous stasis [Skin Lesions] : no skin lesions [No Skin Ulcers] : no skin ulcer [No Xanthoma] : no  xanthoma was observed [Oriented To Time, Place, And Person] : oriented to person, place, and time [Affect] : the affect was normal [FreeTextEntry1] : anxious [Mood] : the mood was normal

## 2021-12-16 NOTE — REVIEW OF SYSTEMS
[Weight Gain (___ Lbs)] : [unfilled] ~Ulb weight gain [Dyspnea on exertion] : dyspnea during exertion [Chest Discomfort] : no chest discomfort [Lower Ext Edema] : no extremity edema [Orthopnea] : no orthopnea [PND] : no PND [Cough] : no cough [Wheezing] : no wheezing [Heartburn] : no heartburn [Change in Appetite] : no change in appetite [Constipation] : no constipation [Blood in Stool] : no blood in stool [Negative] : Heme/Lymph [FreeTextEntry6] : MUNROE [FreeTextEntry9] : PMR (once her sed rate repeated)

## 2021-12-16 NOTE — HISTORY OF PRESENT ILLNESS
[FreeTextEntry1] : June 20, 2017. 77-year-old woman with diabetes mellitus, hyperlipidemia, hypertension who continues to complain of palpitations when she is stressed. She feels her heart being fast. It lasts for a minute or 2. The palpitations then gradually slowed down. She has no associated symptoms. The patient denies exertional dyspnea or chest pressure. She has no lightheadedness or ankle swelling.\par October 30, 2017. Saw Dr. Rosenstein for blurred vision suddenly (not amaurosis). Normal carotid Doppler 10/26/2017. He noted skipped beats or irregular pulse. Here now, feels fine except occasional  rapid beats. In sinus but occasional PC's on exam, with sx. Recent labs with Cholesterol 161, TG 65, HDL 74, LDL 73. HbA1c 5.8. Normal TFTs. Previous echo with minimal MR, AS?AI. Holter with only symptomatic VPCs.\par August 27, 2018. Here for concern over new edema left leg. Not tender,red, hot, etc. No trauma but has arthritis in that knee and recent failed steroid injection. Concerned it's her heart. Workup negative for cardiac and venous Doppler negative for DVT, but positive for Bakers cyst. It was drained by radiology.\par November 30, 2018. Patient here in followup. Had labs October 5, with normal CMP, fasting glucose 98, cholesterol 178, HDL 84, LDL 80. Normal TSH, normal CBC. Asked about baby aspirin, which she has been taking for years. Had negative carotid Dopplers a few years ago, with no evidence of plaque by neurology. No indication for aspirin, so she will stop it. Complained about trouble sleeping, and once tried Ambien from her internist, and didn't like it, but she will followup with the internist.\par December 26, 2018. Patient called earlier today. She had chest pain over Jules day and is concerned. She's also had worsening of her edema and was told to stop her steroids.\par EKG here is sinus rhythm with APCs, but otherwise within normal limits. \par Actually had symptoms of cough, and her internist gave her both Tamiflu and c and then subsequently gave her a Medrol pack, possibly twice She is better than initially, but still has symptoms of shortness of breath with exertion, especially when she tried to walk. Both her dogs. The other night, and concerned about the edema in her legs. Still has productive cough as well and is concerned about being able to make her flight to [place] on January 6. She also cannot hear out of her left ear because it is clogged\par May 2019. Patient called from [place], anxious that her blood pressure is always in the 140s to 150s over 70. Reassured her but then increased her verapamil from 180 q.12 h. to 240 q.12 h.\par July 29, 2019. Patient here in followup. Remains with sinus bradycardia at 58 with an otherwise normal ECG and no evidence of LVH. Outside readings of her blood pressure, mostly in the 130s occasionally even 120/60-70. She was told by the doctors in [place] of extra beats, but she never notices them. No other complaints except trouble sleeping and needing zolpidem.\par November 7, 2019.  Patient returns in follow-up.  EKG remains in sinus rhythm 60 and unchanged.  Good spirits feeling well blood pressure excellent EKG within normal limits.  Her internist took a CBC along with a sed rate because of her previous history of PMR but all was acceptable.  Her orthopedist wants her to have a vitamin D level and a calcium level because of her history of osteoporosis.  She is a little confused as to whether to remain on extra calcium; she takes 500 with vitamin D.  \par December 5, 2019.  Patient called this morning and came in very upset because of edema of her right leg.  Last week she developed the edema and her internist put her on torsemide 20 mg and potassium 20 mEq.  After 3 days she became very lightheaded and dizzy.  He told her to stop it and then when it return he told her to resume and then cut it in half.  She came in today because she is very worried.  It is obvious that the edema is not related to heart failure and it is very mild.  Torsemide is fairly strong.  She had issues with swelling in the other leg about a year ago when she has had recent injections in her right knee for arthritis and a work-up for possible recurrence of PMR despite a normal sed rate.  There is no shortness of breath PND orthopnea chest pain or change in her other status.\par October 26, 2020.  First visit since December of last year.  Remains in sinus rhythm at 62 with a normal ECG other than poor R wave progression.  Her PMR came back and she is having trouble getting off the prednisone; now on 5 mg daily.  This is closed her blood pressure to be elevated along with the stress from COVID-19 and she is very nervous because she has risk factors.  She asked to have both her sed rate and her blood type checked today.  Flu shot given.\par November 25, 2020.  Patient saw her internist yesterday to get her ears cleaned out and her blood pressure was still high.  She also has some swelling in both shins.  Staying on prednisone 5 mg at least until January when she is supposed to go down to 2.5.  Tolerating the candesartan HCT and no longer has rapid heart racing.  Is on furosemide 20 mg as well and still on verapamil.  No beta-blockers because of her asthma.\par Reviewed labs with patient.  She claims her blood pressure is down to 130 and 135.  She is also down to 2.5 mg on the prednisone.  \par May 12, 2021.  Patient here in follow-up.  Remains in sinus rhythm at 57 with a normal ECG.  Blood pressure here 134/72 on the left arm (she tends to be about 10 points higher on the right.  However she says when she checks her pressure at home she usually checks the left arm and her numbers at home are always high).  She is now on omeprazole because she gets heartburn possibly from her prednisone as she is still on 2.5 mg daily.  She claims if she walks even just a little she is out of breath but no chest pressure.  She is seeing pulmonary tomorrow and I told her I will want her to come back for an echocardiogram as well.  Whether she needs some type of stress test remains to be seen.  She is now on some Lexapro because she got very depressed over Covid.  She is fully vaccinated.  She started Tata Frank diet 2 weeks ago.\par June 24, 2021.  Patient returns for follow-up and echo.  Still complaining of shortness of breath with exertion.  She did the Tata Frank diet and stuck to every aspect of it except the exercise and she actually gained weight.  Had a full pulmonary work-up and was told everything seems okay but she is scheduled for a chest CT in 2 weeks and he wants to wait until those results are in.  She had her echo today which shows normal LV function, no significant valve disease, does not even seem to indicate significant diastolic dysfunction and her RVSP is 39 which is slightly higher only from 2016.  She will be short of breath walking outside weather level or incline but in the house she can go up and down the stairs and have no shortness of breath.  The diuretic helped her edema but she did not notice any change in her dyspnea on exertion.  She is reluctant to do any type of stress test even a pharmacologic nuclear 1 but perhaps a CTA would be worthwhile although I do not have a high suspicion of ischemic disease here.  As far as diagnosing diastolic dysfunction it would require a right heart cath which she is not willing to do and again my suspicion is low.  She was vaccinated but she understands that people on immunosuppressive's like she is for her PMR often do not make antibodies and is still at risk and she asked for her antibodies to be checked.\par December 16, 2021.  Patient here in follow-up.  Remains in sinus rhythm at 60 with her EKG unchanged.  She asked for sed rate and CRP which she then faxes to her rheumatologist and he tells her what to do with the prednisone; currently she is on 2.5 mg.  She is getting depressed with only isolation and anxiety about being immunosuppressed and the new variant.  Her blood pressure is slightly up but did come down somewhat at the end of the exam.  She knows she has to work on her weight as well.

## 2021-12-17 LAB
ALBUMIN SERPL ELPH-MCNC: 4.3 G/DL
ALP BLD-CCNC: 68 U/L
ALT SERPL-CCNC: 20 U/L
ANION GAP SERPL CALC-SCNC: 13 MMOL/L
AST SERPL-CCNC: 15 U/L
BASOPHILS # BLD AUTO: 0.04 K/UL
BASOPHILS NFR BLD AUTO: 0.5 %
BILIRUB SERPL-MCNC: 0.6 MG/DL
BUN SERPL-MCNC: 19 MG/DL
CALCIUM SERPL-MCNC: 9.8 MG/DL
CHLORIDE SERPL-SCNC: 98 MMOL/L
CHOLEST SERPL-MCNC: 155 MG/DL
CO2 SERPL-SCNC: 27 MMOL/L
CREAT SERPL-MCNC: 0.65 MG/DL
CRP SERPL-MCNC: 6 MG/L
EOSINOPHIL # BLD AUTO: 0.1 K/UL
EOSINOPHIL NFR BLD AUTO: 1.1 %
ERYTHROCYTE [SEDIMENTATION RATE] IN BLOOD BY WESTERGREN METHOD: 32 MM/HR
ESTIMATED AVERAGE GLUCOSE: 131 MG/DL
GLUCOSE SERPL-MCNC: 93 MG/DL
HBA1C MFR BLD HPLC: 6.2 %
HCT VFR BLD CALC: 40.1 %
HDLC SERPL-MCNC: 72 MG/DL
HGB BLD-MCNC: 13.2 G/DL
IMM GRANULOCYTES NFR BLD AUTO: 0.7 %
LDLC SERPL CALC-MCNC: 69 MG/DL
LYMPHOCYTES # BLD AUTO: 1.5 K/UL
LYMPHOCYTES NFR BLD AUTO: 17.2 %
MAN DIFF?: NORMAL
MCHC RBC-ENTMCNC: 30.5 PG
MCHC RBC-ENTMCNC: 32.9 GM/DL
MCV RBC AUTO: 92.6 FL
MONOCYTES # BLD AUTO: 0.96 K/UL
MONOCYTES NFR BLD AUTO: 11 %
NEUTROPHILS # BLD AUTO: 6.07 K/UL
NEUTROPHILS NFR BLD AUTO: 69.5 %
NONHDLC SERPL-MCNC: 84 MG/DL
NT-PROBNP SERPL-MCNC: 102 PG/ML
PLATELET # BLD AUTO: 252 K/UL
POTASSIUM SERPL-SCNC: 4.7 MMOL/L
PROT SERPL-MCNC: 6.7 G/DL
RBC # BLD: 4.33 M/UL
RBC # FLD: 13.4 %
SODIUM SERPL-SCNC: 138 MMOL/L
TRIGL SERPL-MCNC: 76 MG/DL
WBC # FLD AUTO: 8.73 K/UL

## 2021-12-19 ENCOUNTER — RX RENEWAL (OUTPATIENT)
Age: 81
End: 2021-12-19

## 2022-04-07 ENCOUNTER — RX RENEWAL (OUTPATIENT)
Age: 82
End: 2022-04-07

## 2022-06-02 ENCOUNTER — RX RENEWAL (OUTPATIENT)
Age: 82
End: 2022-06-02

## 2022-06-25 ENCOUNTER — APPOINTMENT (OUTPATIENT)
Dept: ORTHOPEDIC SURGERY | Facility: CLINIC | Age: 82
End: 2022-06-25
Payer: MEDICARE

## 2022-06-25 VITALS — WEIGHT: 156 LBS | HEIGHT: 58 IN | BODY MASS INDEX: 32.75 KG/M2

## 2022-06-25 PROCEDURE — 99214 OFFICE O/P EST MOD 30 MIN: CPT

## 2022-06-25 PROCEDURE — 71100 X-RAY EXAM RIBS UNI 2 VIEWS: CPT | Mod: RT

## 2022-06-25 PROCEDURE — 72040 X-RAY EXAM NECK SPINE 2-3 VW: CPT

## 2022-06-25 NOTE — PHYSICAL EXAM
[NL (90)] : forward flexion 90 degrees [NL (30)] : right lateral rotation 30 degrees [NL (45)] : extension 45 degrees [NL (40)] : right lateral bending 40 degrees [5___] : right extensor hallicus longus 5[unfilled]/5 [Right] : right rib [Fracture] : Fracture [] : non-antalgic [FreeTextEntry8] : right rib tenderness

## 2022-06-25 NOTE — HISTORY OF PRESENT ILLNESS
[10] : 10 [6] : 6 [Sharp] : sharp [Sleep] : sleep [Nothing helps with pain getting better] : Nothing helps with pain getting better [de-identified] : 82 YF with right rib pain s/p fall 2 days ago.  She reports pain with deep inspiration.  No SOB.  She reports some stiffness in her neck as well.  The only thing that has helped her pain is percocet 5/325, that she had from a previous sergery. [] : no [FreeTextEntry1] : right rib [FreeTextEntry3] : 6/23/2022 [FreeTextEntry5] : pt states that she fell on her right side of her body and hurt her right rib and neck [FreeTextEntry7] : to her neck [de-identified] : motion

## 2022-06-25 NOTE — DISCUSSION/SUMMARY
[de-identified] : rib fx, cervical djd. percocet temporary supply\par \par A temporary and limited supply of narcotic pain medication was provided to the patient. Patient was advised to wean usage. Patient was advised of potential side effects, dependency and patient was advised refills may not be provided.\par \par The patient was advised of the diagnosis.  The natural history of the pathology was explained in full. All questions were answered.  The risks and benefits of conservative and interventional treatment alternatives were explained to the patient\par

## 2022-06-25 NOTE — IMAGING
[Straightening consistent with spasm] : Straightening consistent with spasm [Facet arthropathy] : Facet arthropathy [Right] : right rib [Fracture] : Fracture [FreeTextEntry1] : multiple level richardd

## 2022-06-26 ENCOUNTER — RX RENEWAL (OUTPATIENT)
Age: 82
End: 2022-06-26

## 2022-06-28 ENCOUNTER — APPOINTMENT (OUTPATIENT)
Dept: CARDIOLOGY | Facility: CLINIC | Age: 82
End: 2022-06-28

## 2022-07-05 ENCOUNTER — RX RENEWAL (OUTPATIENT)
Age: 82
End: 2022-07-05

## 2022-07-08 ENCOUNTER — APPOINTMENT (OUTPATIENT)
Dept: ORTHOPEDIC SURGERY | Facility: CLINIC | Age: 82
End: 2022-07-08

## 2022-07-08 VITALS
DIASTOLIC BLOOD PRESSURE: 74 MMHG | SYSTOLIC BLOOD PRESSURE: 138 MMHG | HEART RATE: 54 BPM | HEIGHT: 58 IN | BODY MASS INDEX: 32.75 KG/M2 | TEMPERATURE: 97.3 F | WEIGHT: 156 LBS

## 2022-07-08 DIAGNOSIS — M47.812 SPONDYLOSIS W/OUT MYELOPATHY OR RADICULOPATHY, CERVICAL REGION: ICD-10-CM

## 2022-07-08 DIAGNOSIS — S22.41XA MULTIPLE FRACTURES OF RIBS, RIGHT SIDE, INITIAL ENCOUNTER FOR CLOSED FRACTURE: ICD-10-CM

## 2022-07-08 PROCEDURE — 71100 X-RAY EXAM RIBS UNI 2 VIEWS: CPT | Mod: RT

## 2022-07-08 PROCEDURE — 99213 OFFICE O/P EST LOW 20 MIN: CPT

## 2022-07-08 NOTE — DISCUSSION/SUMMARY
[de-identified] : rib fx, cervical djd. methocarbamol, tylenol. fu 4 weeks - advised drowsiness from muscle relaxer\par \par The patient was advised of the diagnosis.  The natural history of the pathology was explained in full. All questions were answered.  The risks and benefits of conservative and interventional treatment alternatives were explained to the patient\par

## 2022-07-08 NOTE — HISTORY OF PRESENT ILLNESS
[10] : 10 [Dull/Aching] : dull/aching [Sharp] : sharp [Retired] : Work status: retired [6] : 6 [Sleep] : sleep [Nothing helps with pain getting better] : Nothing helps with pain getting better [de-identified] : 82 YF with right rib pain s/p fall 2 days ago.  She reports pain with deep inspiration.  No SOB.  She reports some stiffness in her neck as well.  The only thing that has helped her pain is percocet 5/325, that she had from a previous sergery.\par \par still having right rib pain, some difficulty with deep breaths [] : Post Surgical Visit: no [FreeTextEntry1] : right rib [FreeTextEntry3] : 6/23/2022 [FreeTextEntry5] : pt states that she fell on her right side of her body and hurt her right rib and neck [FreeTextEntry7] : to her neck [de-identified] : motion [de-identified] : oxycodone, tramadol

## 2022-07-10 ENCOUNTER — NON-APPOINTMENT (OUTPATIENT)
Age: 82
End: 2022-07-10

## 2022-07-12 ENCOUNTER — EMERGENCY (EMERGENCY)
Facility: HOSPITAL | Age: 82
LOS: 1 days | Discharge: HOME | End: 2022-07-12
Attending: EMERGENCY MEDICINE
Payer: MEDICARE

## 2022-07-12 ENCOUNTER — APPOINTMENT (OUTPATIENT)
Dept: VASCULAR SURGERY | Facility: CLINIC | Age: 82
End: 2022-07-12

## 2022-07-12 VITALS — HEART RATE: 54 BPM | DIASTOLIC BLOOD PRESSURE: 73 MMHG | SYSTOLIC BLOOD PRESSURE: 134 MMHG

## 2022-07-12 VITALS
HEIGHT: 58 IN | SYSTOLIC BLOOD PRESSURE: 106 MMHG | OXYGEN SATURATION: 97 % | WEIGHT: 164.91 LBS | DIASTOLIC BLOOD PRESSURE: 61 MMHG | RESPIRATION RATE: 16 BRPM | TEMPERATURE: 98 F | HEART RATE: 65 BPM

## 2022-07-12 VITALS — TEMPERATURE: 98 F

## 2022-07-12 LAB
ALBUMIN SERPL ELPH-MCNC: 4.6 G/DL — SIGNIFICANT CHANGE UP (ref 3.3–5)
ALP SERPL-CCNC: 115 U/L — SIGNIFICANT CHANGE UP (ref 40–120)
ALT FLD-CCNC: 17 U/L — SIGNIFICANT CHANGE UP (ref 10–45)
ANION GAP SERPL CALC-SCNC: 12 MMOL/L — SIGNIFICANT CHANGE UP (ref 5–17)
APTT BLD: 30.8 SEC — SIGNIFICANT CHANGE UP (ref 27.5–35.5)
AST SERPL-CCNC: 18 U/L — SIGNIFICANT CHANGE UP (ref 10–40)
BASE EXCESS BLDV CALC-SCNC: 3.5 MMOL/L — HIGH (ref -2–2)
BASOPHILS # BLD AUTO: 0.06 K/UL — SIGNIFICANT CHANGE UP (ref 0–0.2)
BASOPHILS NFR BLD AUTO: 0.8 % — SIGNIFICANT CHANGE UP (ref 0–2)
BILIRUB SERPL-MCNC: 0.5 MG/DL — SIGNIFICANT CHANGE UP (ref 0.2–1.2)
BUN SERPL-MCNC: 17 MG/DL — SIGNIFICANT CHANGE UP (ref 7–23)
CA-I SERPL-SCNC: 1.25 MMOL/L — SIGNIFICANT CHANGE UP (ref 1.15–1.33)
CALCIUM SERPL-MCNC: 9.6 MG/DL — SIGNIFICANT CHANGE UP (ref 8.4–10.5)
CHLORIDE BLDV-SCNC: 98 MMOL/L — SIGNIFICANT CHANGE UP (ref 96–108)
CHLORIDE SERPL-SCNC: 99 MMOL/L — SIGNIFICANT CHANGE UP (ref 96–108)
CO2 BLDV-SCNC: 30 MMOL/L — HIGH (ref 22–26)
CO2 SERPL-SCNC: 26 MMOL/L — SIGNIFICANT CHANGE UP (ref 22–31)
CREAT SERPL-MCNC: 0.51 MG/DL — SIGNIFICANT CHANGE UP (ref 0.5–1.3)
EGFR: 94 ML/MIN/1.73M2 — SIGNIFICANT CHANGE UP
EOSINOPHIL # BLD AUTO: 0.1 K/UL — SIGNIFICANT CHANGE UP (ref 0–0.5)
EOSINOPHIL NFR BLD AUTO: 1.3 % — SIGNIFICANT CHANGE UP (ref 0–6)
GAS PNL BLDV: 131 MMOL/L — LOW (ref 136–145)
GAS PNL BLDV: SIGNIFICANT CHANGE UP
GLUCOSE BLDV-MCNC: 92 MG/DL — SIGNIFICANT CHANGE UP (ref 70–99)
GLUCOSE SERPL-MCNC: 96 MG/DL — SIGNIFICANT CHANGE UP (ref 70–99)
HCO3 BLDV-SCNC: 29 MMOL/L — SIGNIFICANT CHANGE UP (ref 22–29)
HCT VFR BLD CALC: 36.9 % — SIGNIFICANT CHANGE UP (ref 34.5–45)
HCT VFR BLDA CALC: 38 % — SIGNIFICANT CHANGE UP (ref 34.5–46.5)
HGB BLD CALC-MCNC: 12.8 G/DL — SIGNIFICANT CHANGE UP (ref 11.7–16.1)
HGB BLD-MCNC: 12.4 G/DL — SIGNIFICANT CHANGE UP (ref 11.5–15.5)
IMM GRANULOCYTES NFR BLD AUTO: 0.4 % — SIGNIFICANT CHANGE UP (ref 0–1.5)
INR BLD: 0.98 RATIO — SIGNIFICANT CHANGE UP (ref 0.88–1.16)
LACTATE BLDV-MCNC: 0.9 MMOL/L — SIGNIFICANT CHANGE UP (ref 0.7–2)
LYMPHOCYTES # BLD AUTO: 1.45 K/UL — SIGNIFICANT CHANGE UP (ref 1–3.3)
LYMPHOCYTES # BLD AUTO: 18.6 % — SIGNIFICANT CHANGE UP (ref 13–44)
MCHC RBC-ENTMCNC: 30.2 PG — SIGNIFICANT CHANGE UP (ref 27–34)
MCHC RBC-ENTMCNC: 33.6 GM/DL — SIGNIFICANT CHANGE UP (ref 32–36)
MCV RBC AUTO: 89.8 FL — SIGNIFICANT CHANGE UP (ref 80–100)
MONOCYTES # BLD AUTO: 0.98 K/UL — HIGH (ref 0–0.9)
MONOCYTES NFR BLD AUTO: 12.5 % — SIGNIFICANT CHANGE UP (ref 2–14)
NEUTROPHILS # BLD AUTO: 5.19 K/UL — SIGNIFICANT CHANGE UP (ref 1.8–7.4)
NEUTROPHILS NFR BLD AUTO: 66.4 % — SIGNIFICANT CHANGE UP (ref 43–77)
NRBC # BLD: 0 /100 WBCS — SIGNIFICANT CHANGE UP (ref 0–0)
PCO2 BLDV: 47 MMHG — HIGH (ref 39–42)
PH BLDV: 7.4 — SIGNIFICANT CHANGE UP (ref 7.32–7.43)
PLATELET # BLD AUTO: 270 K/UL — SIGNIFICANT CHANGE UP (ref 150–400)
PO2 BLDV: 35 MMHG — SIGNIFICANT CHANGE UP (ref 25–45)
POTASSIUM BLDV-SCNC: 3.6 MMOL/L — SIGNIFICANT CHANGE UP (ref 3.5–5.1)
POTASSIUM SERPL-MCNC: 3.8 MMOL/L — SIGNIFICANT CHANGE UP (ref 3.5–5.3)
POTASSIUM SERPL-SCNC: 3.8 MMOL/L — SIGNIFICANT CHANGE UP (ref 3.5–5.3)
PROT SERPL-MCNC: 7.5 G/DL — SIGNIFICANT CHANGE UP (ref 6–8.3)
PROTHROM AB SERPL-ACNC: 11.3 SEC — SIGNIFICANT CHANGE UP (ref 10.5–13.4)
RBC # BLD: 4.11 M/UL — SIGNIFICANT CHANGE UP (ref 3.8–5.2)
RBC # FLD: 12.8 % — SIGNIFICANT CHANGE UP (ref 10.3–14.5)
SAO2 % BLDV: 51.7 % — LOW (ref 67–88)
SARS-COV-2 RNA SPEC QL NAA+PROBE: SIGNIFICANT CHANGE UP
SODIUM SERPL-SCNC: 137 MMOL/L — SIGNIFICANT CHANGE UP (ref 135–145)
TROPONIN T, HIGH SENSITIVITY RESULT: 12 NG/L — SIGNIFICANT CHANGE UP (ref 0–51)
WBC # BLD: 7.81 K/UL — SIGNIFICANT CHANGE UP (ref 3.8–10.5)
WBC # FLD AUTO: 7.81 K/UL — SIGNIFICANT CHANGE UP (ref 3.8–10.5)

## 2022-07-12 PROCEDURE — 80053 COMPREHEN METABOLIC PANEL: CPT

## 2022-07-12 PROCEDURE — 36415 COLL VENOUS BLD VENIPUNCTURE: CPT

## 2022-07-12 PROCEDURE — 99284 EMERGENCY DEPT VISIT MOD MDM: CPT | Mod: FS

## 2022-07-12 PROCEDURE — U0003: CPT

## 2022-07-12 PROCEDURE — 84132 ASSAY OF SERUM POTASSIUM: CPT

## 2022-07-12 PROCEDURE — 71275 CT ANGIOGRAPHY CHEST: CPT | Mod: 26,MA

## 2022-07-12 PROCEDURE — 85025 COMPLETE CBC W/AUTO DIFF WBC: CPT

## 2022-07-12 PROCEDURE — 99284 EMERGENCY DEPT VISIT MOD MDM: CPT | Mod: 25

## 2022-07-12 PROCEDURE — 84484 ASSAY OF TROPONIN QUANT: CPT

## 2022-07-12 PROCEDURE — 93970 EXTREMITY STUDY: CPT

## 2022-07-12 PROCEDURE — 85014 HEMATOCRIT: CPT

## 2022-07-12 PROCEDURE — 71275 CT ANGIOGRAPHY CHEST: CPT | Mod: MA

## 2022-07-12 PROCEDURE — U0005: CPT

## 2022-07-12 PROCEDURE — 82947 ASSAY GLUCOSE BLOOD QUANT: CPT

## 2022-07-12 PROCEDURE — 85730 THROMBOPLASTIN TIME PARTIAL: CPT

## 2022-07-12 PROCEDURE — 85610 PROTHROMBIN TIME: CPT

## 2022-07-12 PROCEDURE — 99203 OFFICE O/P NEW LOW 30 MIN: CPT

## 2022-07-12 PROCEDURE — 85018 HEMOGLOBIN: CPT

## 2022-07-12 PROCEDURE — 82803 BLOOD GASES ANY COMBINATION: CPT

## 2022-07-12 PROCEDURE — 82330 ASSAY OF CALCIUM: CPT

## 2022-07-12 PROCEDURE — 83605 ASSAY OF LACTIC ACID: CPT

## 2022-07-12 PROCEDURE — 82435 ASSAY OF BLOOD CHLORIDE: CPT

## 2022-07-12 PROCEDURE — 84295 ASSAY OF SERUM SODIUM: CPT

## 2022-07-12 RX ORDER — ASPIRIN 81 MG
6.5 TABLET, DELAYED RELEASE (ENTERIC COATED) ORAL
Qty: 1 | Refills: 4 | Status: DISCONTINUED | COMMUNITY
Start: 2020-10-26 | End: 2022-07-12

## 2022-07-12 RX ORDER — ESCITALOPRAM OXALATE 20 MG/1
20 TABLET, FILM COATED ORAL
Refills: 0 | Status: ACTIVE | COMMUNITY

## 2022-07-12 RX ORDER — ASCORBIC ACID 500 MG
TABLET ORAL
Refills: 0 | Status: ACTIVE | COMMUNITY

## 2022-07-12 RX ORDER — LEVALBUTEROL 1.25 MG/.5ML
SOLUTION, CONCENTRATE RESPIRATORY (INHALATION)
Refills: 0 | Status: ACTIVE | COMMUNITY

## 2022-07-12 RX ORDER — DORZOLAMIDE HYDROCHLORIDE TIMOLOL MALEATE 20; 5 MG/ML; MG/ML
SOLUTION/ DROPS OPHTHALMIC
Refills: 0 | Status: ACTIVE | COMMUNITY

## 2022-07-12 RX ORDER — ZOLPIDEM TARTRATE 5 MG/1
5 TABLET ORAL
Qty: 30 | Refills: 0 | Status: DISCONTINUED | COMMUNITY
Start: 2017-07-27 | End: 2022-07-12

## 2022-07-12 RX ORDER — OXYCODONE AND ACETAMINOPHEN 5; 325 MG/1; MG/1
5-325 TABLET ORAL
Qty: 15 | Refills: 0 | Status: DISCONTINUED | COMMUNITY
Start: 2022-06-25 | End: 2022-07-12

## 2022-07-12 RX ORDER — PREDNISONE 2.5 MG/1
2.5 TABLET ORAL DAILY
Qty: 14 | Refills: 0 | Status: DISCONTINUED | COMMUNITY
Start: 2020-10-26 | End: 2022-07-12

## 2022-07-12 RX ORDER — FUROSEMIDE 20 MG/1
20 TABLET ORAL
Qty: 90 | Refills: 0 | Status: DISCONTINUED | COMMUNITY
Start: 2018-12-26 | End: 2022-07-12

## 2022-07-12 RX ORDER — UBIDECARENONE/VIT E ACET 100MG-5
CAPSULE ORAL
Refills: 0 | Status: ACTIVE | COMMUNITY

## 2022-07-12 RX ORDER — CLONAZEPAM 2 MG/1
TABLET ORAL
Refills: 0 | Status: ACTIVE | COMMUNITY

## 2022-07-12 RX ORDER — BUDESONIDE AND FORMOTEROL FUMARATE DIHYDRATE 160; 4.5 UG/1; UG/1
AEROSOL RESPIRATORY (INHALATION)
Refills: 0 | Status: ACTIVE | COMMUNITY

## 2022-07-12 RX ORDER — RIVAROXABAN 15 MG-20MG
1 KIT ORAL
Qty: 14 | Refills: 0
Start: 2022-07-12 | End: 2022-07-25

## 2022-07-12 RX ORDER — AMOXICILLIN AND CLAVULANATE POTASSIUM 500; 125 MG/1; MG/1
500-125 TABLET, FILM COATED ORAL 3 TIMES DAILY
Qty: 21 | Refills: 0 | Status: DISCONTINUED | COMMUNITY
Start: 2018-12-26 | End: 2022-07-12

## 2022-07-12 RX ORDER — ALBUTEROL SULFATE 90 UG/1
INHALANT RESPIRATORY (INHALATION)
Refills: 0 | Status: ACTIVE | COMMUNITY

## 2022-07-12 RX ORDER — ACETAMINOPHEN 500 MG
975 TABLET ORAL ONCE
Refills: 0 | Status: COMPLETED | OUTPATIENT
Start: 2022-07-12 | End: 2022-07-12

## 2022-07-12 RX ORDER — METHOCARBAMOL 750 MG/1
750 TABLET, FILM COATED ORAL TWICE DAILY
Qty: 20 | Refills: 0 | Status: DISCONTINUED | COMMUNITY
Start: 2022-07-08 | End: 2022-07-12

## 2022-07-12 RX ORDER — LIDOCAINE 4 G/100G
1 CREAM TOPICAL ONCE
Refills: 0 | Status: COMPLETED | OUTPATIENT
Start: 2022-07-12 | End: 2022-07-12

## 2022-07-12 RX ADMIN — LIDOCAINE 1 PATCH: 4 CREAM TOPICAL at 18:22

## 2022-07-12 RX ADMIN — Medication 975 MILLIGRAM(S): at 18:19
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
## 2022-07-12 NOTE — ED PROVIDER NOTE - PROGRESS NOTE DETAILS
all results including incidental findings on CT discussed with pt. DOAC options discussed with  Morenita - pt was given Xarelto 30-day trial, ED team sent course to pharmacy. Will dc with follow up. Discussed plan and return precautions with patient who understands and agrees. All questions answered. - Lucas Valdez PA-C

## 2022-07-12 NOTE — ED PROVIDER NOTE - MUSCULOSKELETAL, MLM
Calves swollen but equal, left calf is warm, minimally tender w/minimal overlying erythema and serous skin weeping. Spine appears normal, range of motion is not limited, no muscle or joint tenderness

## 2022-07-12 NOTE — ED PROVIDER NOTE - NSFOLLOWUPINSTRUCTIONS_ED_ALL_ED_FT
Please follow up with your primary care doctor within 1 week.  *Bring all printed lab/test results to your appointment(s).*    Start taking __ as prescribed for your diagnosed DVT. Follow up with your PMD or vascular doctor for continued prescriptions    For continued pain from your rib fractures:  Take acetaminophen 500-1000mg every 6 hrs as needed for pain. DO NOT EXCEED 4000mg DAILY.  Take ibuprofen 400-600mg every 6 hrs as needed for pain. Take with food  Apply lidocaine patch 4% twice daily as needed.     Return to the ED for worsening shortness of breath, chest pain, fever, chills, or any other concerns. Please follow up with your primary care doctor within 1 week.  *Bring all printed lab/test results to your appointment(s).*    Start taking Xarelto 20mg once daily as prescribed for your diagnosed DVT. Follow up with your PMD or vascular doctor for continued prescriptions. Please call the phone number on the packet given to you.    For continued pain from your rib fractures:  Take acetaminophen 500-1000mg every 6 hrs as needed for pain. DO NOT EXCEED 4000mg DAILY.  Take ibuprofen 400-600mg every 6 hrs as needed for pain. Take with food  Apply lidocaine patch 4% twice daily as needed.     Return to the ED for worsening shortness of breath, chest pain, fever, chills, or any other concerns.

## 2022-07-12 NOTE — ED ADULT NURSE NOTE - OBJECTIVE STATEMENT
Ambulatory, well-appearing patient in no apparent distress complains of DVT.  Pt reports fall with rib fracture 2 weeks ago, has been having shortness of breath and now with 2 days of LLE swelling and watery secretions from leg.  [place] [facility] [place]LE D[place], sent to ER for r/o PE.  Pt arrives to exam room with test results available from [facility].  Spoke with pt and her son Warren on the phone at length regarding results and plan of care moving forward.  Stressed the importance of fall precautions and emergent evaluation in the event of any falls especially with head trauma.  Pt currently in no distress, abdomen soft/non-distended/non-tender denies chest pain, cough, abdominal pain, nausea, vomiting, diarrhea, fevers, chills, urinary symptoms.

## 2022-07-12 NOTE — ED PROVIDER NOTE - RAPID ASSESSMENT
81 y/o F w/ PMHx HTN, HLD and GERD, known DVT of leg found on US this morning, sent into ED to r/o PE. +SOB. Denies any cough, F/V or blood thinner use. Pt is comfortable appearing. NAD. Normal respiratory effort.      IIra (Scrib[employer]) have documented this rapid assessment note under the dictation of Leonardo Patel (PA) which has been reviewed and affirmed to be accurate. Patient was seen as a QPA patient. The patient will be seen and further worked up in the main emergency department and their care will be completed by the main emergency department team along with a thorough physical exam. Receiving team will follow up on labs, analgesia, any clinical imaging, reassess and disposition as clinically indicated, all decisions regarding the progression of care will be made at their discretion. 81 y/o F w/ PMHx HTN, HLD and GERD, known DVT of leg found on US this morning, sent into ED to r/o PE. +SOB. Denies any cough, F/V or blood thinner use. Pt is comfortable appearing. NAD. Normal respiratory effort.      Ira BUCK (Scribe) have documented this rapid assessment note under the dictation of Leonardo Patel (PA) which has been reviewed and affirmed to be accurate. Patient was seen as a QPA patient. The patient will be seen and further worked up in the main emergency department and their care will be completed by the main emergency department team along with a thorough physical exam. Receiving team will follow up on labs, analgesia, any clinical imaging, reassess and disposition as clinically indicated, all decisions regarding the progression of care will be made at their discretion.    Leonardo Patel (PA) note: This scribe's documentation has been prepared under my direction and personally reviewed by me. The patient will be seen and further worked up in the main emergency department and their care will be completed by the main emergency department team along with a thorough physical exam. Receiving team will follow up on labs, analgesia, any clinical imaging, reassess and disposition as clinically indicated, all decisions regarding the progression of care will be made at their discretion. 81 y/o F w/ PMHx HTN, HLD and GERD, known DVT of leg found on US this morning, sent into ED to r/o PE. +SOB. Denies any cough, F/V or blood thinner use. Pt is comfortable appearing. NAD. Normal respiratory effort.      Ira BUCK (Scribe) have documented this rapid assessment note under the dictation of Leonardo Patel (PA) which has been reviewed and affirmed to be accurate. Patient was seen as a QPA patient. The patient will be seen and further worked up in the main emergency department and their care will be completed by the main emergency department team along with a thorough physical exam. Receiving team will follow up on labs, analgesia, any clinical imaging, reassess and disposition as clinically indicated, all decisions regarding the progression of care will be made at their discretion.    Leonardo Patel (PA) note: This scribe's documentation has been prepared under my direction and personally reviewed by me. The patient will be seen and further worked up in the main emergency department and their care will be completed by the main emergency department team along with a thorough physical exam. Receiving team will follow up on labs, analgesia, any clinical imaging, reassess and disposition as clinically indicated, all decisions regarding the progression of care will be made at their discretion.    Attending MD Gonzalez: This patient was seen and orders were placed by the PA as per our department's QPA model.  I was not consulted in regards to this patient although I was present and available in the Emergency Department to the PA.  Patient was to be sent to main ED for full medical evaluation and receiving team was to follow up on any labs, analgesia, clinical imaging ordered by the PA.  Any reassessment and disposition decisions were to be made by receiving team as clinically indicated, all decisions regarding the progression of care to be made at their discretion.  I did not perform a comprehensive history and physical on this patient.

## 2022-07-12 NOTE — ED PROVIDER NOTE - NSICDXPASTSURGICALHX_GEN_ALL_CORE_FT
PAST SURGICAL HISTORY:  Carpal tunnel syndrome, bilateral repair done many yrs ago    Cataracts, bilateral repair  done many yrs ago    Delivery By Elective Caesarean Section x 2    Femur fracture, left h/o ORIF    Femur fracture, right h/o ORIF

## 2022-07-12 NOTE — ED PROVIDER NOTE - PATIENT PORTAL LINK FT
You can access the FollowMyHealth Patient Portal offered by [facility] by registering at the following website: [email or web address]. By joining [facility]’s FollowMyHealth portal, you will also be able to view your health information using other applications (apps) compatible with our system.

## 2022-07-12 NOTE — ED PROVIDER NOTE - OBJECTIVE STATEMENT
82y f PMHX HTN, HLD, GERD sent in to r/o PE. pt went to outpt bilateral lower ext dopplers this am for progressive lower ext swelling x1 week, was notified she had a DVT of the LLE. pt endorses mild SOB, persistent since diagnosed with rib fractures recently. Denies chest pain, recent travel. admits to sedentary lifestyle since injury. ambulating without difficulty. denies fever, chills, palpitations, lower ext numbness/weakness. nonsmoker 82y f PMHX HTN, HLD, GERD sent in to r/o PE. pt went to outpt bilateral lower ext dopplers this am for progressive lower ext swelling x1 week, was notified she had a DVT of the LLE. pt endorses mild SOB, persistent since diagnosed with rib fractures recently. Denies chest pain, recent travel. admits to sedentary lifestyle since injury. ambulating without difficulty. denies fever, chills, palpitations, lower ext numbness/weakness. nonsmoker    Attendinyo female presents with DVT diagnosed this morning.  sent ot r/o PE.  has mild shortness of breath occasionally.  no fever.

## 2022-07-12 NOTE — ED PROVIDER NOTE - NS ED ATTENDING STATEMENT MOD
This was a shared visit with the CHAY. I reviewed and verified the documentation and independently performed the documented:

## 2022-07-12 NOTE — ED ADULT NURSE REASSESSMENT NOTE - NS ED NURSE REASSESS COMMENT FT1
[id number] family would like to speak to a provider before xray/Bhavik Stanley made aware continues to decline xray
Pt anxious for discharge, advised once her prescriptions are sent and discharge papers available she will be able to go home, provided with meal while waiting
0253 pt and family declined xray states ct was done/

## 2022-07-14 ENCOUNTER — APPOINTMENT (OUTPATIENT)
Dept: CARDIOLOGY | Facility: CLINIC | Age: 82
End: 2022-07-14

## 2022-07-14 PROCEDURE — 99214 OFFICE O/P EST MOD 30 MIN: CPT | Mod: 95

## 2022-07-14 NOTE — PHYSICAL EXAM
[General Appearance - Well Developed] : well developed [Normal Appearance] : normal appearance [Well Groomed] : well groomed [General Appearance - Well Nourished] : well nourished [No Deformities] : no deformities [General Appearance - In No Acute Distress] : no acute distress [Normal Conjunctiva] : the conjunctiva exhibited no abnormalities [Eyelids - No Xanthelasma] : the eyelids demonstrated no xanthelasmas [Normal Oral Mucosa] : normal oral mucosa [No Oral Pallor] : no oral pallor [No Oral Cyanosis] : no oral cyanosis [Normal Jugular Venous A Waves Present] : normal jugular venous A waves present [Normal Jugular Venous V Waves Present] : normal jugular venous V waves present [No Jugular Venous Cuevas A Waves] : no jugular venous cuevas A waves [Respiration, Rhythm And Depth] : normal respiratory rhythm and effort [Exaggerated Use Of Accessory Muscles For Inspiration] : no accessory muscle use [Auscultation Breath Sounds / Voice Sounds] : lungs were clear to auscultation bilaterally [Heart Rate And Rhythm] : heart rate and rhythm were normal [Heart Sounds] : normal S1 and S2 [Murmurs] : no murmurs present [Abdomen Soft] : soft [Abdomen Tenderness] : non-tender [Abdomen Mass (___ Cm)] : no abdominal mass palpated [Abnormal Walk] : normal gait [Gait - Sufficient For Exercise Testing] : the gait was sufficient for exercise testing [Nail Clubbing] : no clubbing of the fingernails [Cyanosis, Localized] : no localized cyanosis [Petechial Hemorrhages (___cm)] : no petechial hemorrhages [Skin Color & Pigmentation] : normal skin color and pigmentation [] : no rash [No Venous Stasis] : no venous stasis [No Skin Ulcers] : no skin ulcer [Skin Lesions] : no skin lesions [No Xanthoma] : no  xanthoma was observed [Oriented To Time, Place, And Person] : oriented to person, place, and time [Affect] : the affect was normal [Mood] : the mood was normal [FreeTextEntry1] : anxious

## 2022-07-14 NOTE — REASON FOR VISIT
[FreeTextEntry1] : The patient is a 82-year-old woman with a history of hypertension, hypercholesterolemia, and carotid stenosis for follow up visit still with PMR, now status post a fall with broken ribs and DVT being followed by vascular Dr. Michaela Franco.

## 2022-07-14 NOTE — HISTORY OF PRESENT ILLNESS
[FreeTextEntry1] : June 20, 2017. 77-year-old woman with diabetes mellitus, hyperlipidemia, hypertension who continues to complain of palpitations when she is stressed. She feels her heart being fast. It lasts for a minute or 2. The palpitations then gradually slowed down. She has no associated symptoms. The patient denies exertional dyspnea or chest pressure. She has no lightheadedness or ankle swelling.\par October 30, 2017. Saw Dr. Rosenstein for blurred vision suddenly (not amaurosis). Normal carotid Doppler 10/26/2017. He noted skipped beats or irregular pulse. Here now, feels fine except occasional  rapid beats. In sinus but occasional PC's on exam, with sx. Recent labs with Cholesterol 161, TG 65, HDL 74, LDL 73. HbA1c 5.8. Normal TFTs. Previous echo with minimal MR, AS?AI. Holter with only symptomatic VPCs.\par August 27, 2018. Here for concern over new edema left leg. Not tender,red, hot, etc. No trauma but has arthritis in that knee and recent failed steroid injection. Concerned it's her heart. Workup negative for cardiac and venous Doppler negative for DVT, but positive for Bakers cyst. It was drained by radiology.\par November 30, 2018. Patient here in followup. Had labs October 5, with normal CMP, fasting glucose 98, cholesterol 178, HDL 84, LDL 80. Normal TSH, normal CBC. Asked about baby aspirin, which she has been taking for years. Had negative carotid Dopplers a few years ago, with no evidence of plaque by neurology. No indication for aspirin, so she will stop it. Complained about trouble sleeping, and once tried Ambien from her internist, and didn't like it, but she will followup with the internist.\par December 26, 2018. Patient called earlier today. She had chest pain over Jules day and is concerned. She's also had worsening of her edema and was told to stop her steroids.\par EKG here is sinus rhythm with APCs, but otherwise within normal limits. \par Actually had symptoms of cough, and her internist gave her both Tamiflu and c and then subsequently gave her a Medrol pack, possibly twice She is better than initially, but still has symptoms of shortness of breath with exertion, especially when she tried to walk. Both her dogs. The other night, and concerned about the edema in her legs. Still has productive cough as well and is concerned about being able to make her flight to [place] on January 6. She also cannot hear out of her left ear because it is clogged\par May 2019. Patient called from [place], anxious that her blood pressure is always in the 140s to 150s over 70. Reassured her but then increased her verapamil from 180 q.12 h. to 240 q.12 h.\par July 29, 2019. Patient here in followup. Remains with sinus bradycardia at 58 with an otherwise normal ECG and no evidence of LVH. Outside readings of her blood pressure, mostly in the 130s occasionally even 120/60-70. She was told by the doctors in [place] of extra beats, but she never notices them. No other complaints except trouble sleeping and needing zolpidem.\par November 7, 2019.  Patient returns in follow-up.  EKG remains in sinus rhythm 60 and unchanged.  Good spirits feeling well blood pressure excellent EKG within normal limits.  Her internist took a CBC along with a sed rate because of her previous history of PMR but all was acceptable.  Her orthopedist wants her to have a vitamin D level and a calcium level because of her history of osteoporosis.  She is a little confused as to whether to remain on extra calcium; she takes 500 with vitamin D.  \par December 5, 2019.  Patient called this morning and came in very upset because of edema of her right leg.  Last week she developed the edema and her internist put her on torsemide 20 mg and potassium 20 mEq.  After 3 days she became very lightheaded and dizzy.  He told her to stop it and then when it return he told her to resume and then cut it in half.  She came in today because she is very worried.  It is obvious that the edema is not related to heart failure and it is very mild.  Torsemide is fairly strong.  She had issues with swelling in the other leg about a year ago when she has had recent injections in her right knee for arthritis and a work-up for possible recurrence of PMR despite a normal sed rate.  There is no shortness of breath PND orthopnea chest pain or change in her other status.\par October 26, 2020.  First visit since December of last year.  Remains in sinus rhythm at 62 with a normal ECG other than poor R wave progression.  Her PMR came back and she is having trouble getting off the prednisone; now on 5 mg daily.  This is closed her blood pressure to be elevated along with the stress from COVID-19 and she is very nervous because she has risk factors.  She asked to have both her sed rate and her blood type checked today.  Flu shot given.\par November 25, 2020.  Patient saw her internist yesterday to get her ears cleaned out and her blood pressure was still high.  She also has some swelling in both shins.  Staying on prednisone 5 mg at least until January when she is supposed to go down to 2.5.  Tolerating the candesartan HCT and no longer has rapid heart racing.  Is on furosemide 20 mg as well and still on verapamil.  No beta-blockers because of her asthma.\par Reviewed labs with patient.  She claims her blood pressure is down to 130 and 135.  She is also down to 2.5 mg on the prednisone.  \par May 12, 2021.  Patient here in follow-up.  Remains in sinus rhythm at 57 with a normal ECG.  Blood pressure here 134/72 on the left arm (she tends to be about 10 points higher on the right.  However she says when she checks her pressure at home she usually checks the left arm and her numbers at home are always high).  She is now on omeprazole because she gets heartburn possibly from her prednisone as she is still on 2.5 mg daily.  She claims if she walks even just a little she is out of breath but no chest pressure.  She is seeing pulmonary tomorrow and I told her I will want her to come back for an echocardiogram as well.  Whether she needs some type of stress test remains to be seen.  She is now on some Lexapro because she got very depressed over Covid.  She is fully vaccinated.  She started Tata Frank diet 2 weeks ago.\par June 24, 2021.  Patient returns for follow-up and echo.  Still complaining of shortness of breath with exertion.  She did the Tata Frank diet and stuck to every aspect of it except the exercise and she actually gained weight.  Had a full pulmonary work-up and was told everything seems okay but she is scheduled for a chest CT in 2 weeks and he wants to wait until those results are in.  She had her echo today which shows normal LV function, no significant valve disease, does not even seem to indicate significant diastolic dysfunction and her RVSP is 39 which is slightly higher only from 2016.  She will be short of breath walking outside weather level or incline but in the house she can go up and down the stairs and have no shortness of breath.  The diuretic helped her edema but she did not notice any change in her dyspnea on exertion.  She is reluctant to do any type of stress test even a pharmacologic nuclear 1 but perhaps a CTA would be worthwhile although I do not have a high suspicion of ischemic disease here.  As far as diagnosing diastolic dysfunction it would require a right heart cath which she is not willing to do and again my suspicion is low.  She was vaccinated but she understands that people on immunosuppressive's like she is for her PMR often do not make antibodies and is still at risk and she asked for her antibodies to be checked.\par December 16, 2021.  Patient here in follow-up.  Remains in sinus rhythm at 60 with her EKG unchanged.  She asked for sed rate and CRP which she then faxes to her rheumatologist and he tells her what to do with the prednisone; currently she is on 2.5 mg.  She is getting depressed with only isolation and anxiety about being immunosuppressed and the new variant.  Her blood pressure is slightly up but did come down somewhat at the end of the exam.  She knows she has to work on her weight as well.\par July 14, 2022.  Telehealth visit. Narrative: The patient fell about 2 weeks ago when she missed a step, fractured some ribs and has been having lower extremity swelling since then being mostly sedentary on the couch. Started to have weeping edema of her left leg and went to [facility] emergency room was found to have acute DVT on the left and was placed on Xarelto 20 mg daily. She has been seeing Dr. Michaela Franco of vascular and they are scheduled for a telehealth visit tomorrow morning. Part of the work-up showed coronary calcification on CT scan. Patient is otherwise on the same medications besides the Xarelto. She is still on verapamil twice a day as well and rosuvastatin. On the right leg she does have reflux in the saphenous Brothers junction while the left leg is positive for an acute occlusive DVT in the common femoral vein extending into the TP trunk. Her blood pressure at the visit to vascular was 134/73. Pulmonary embolism was ruled out in the emergency room.\par Assessment: I reviewed the medications and course with the patient and I believe her sister or daughter. As mentioned she will have a telehealth visit with vascular tomorrow morning. I reinforced the importance of staying on the Xarelto and I will allow vascular to recommend whether she should be on for 3 or 6 months or perhaps lifelong. She will remain on all her other medications. If necessary I can arrange home draw for labs but hopefully she will be able to come to the office in about 2 months once her ribs are healed.\par Plan: Continue current medications which now include Xarelto 20 mg.\par Follow-up: Tomorrow morning telehealth visit with vascular. Follow-up with me in the office 2 months.\par \par

## 2022-07-14 NOTE — REVIEW OF SYSTEMS
[Dyspnea on exertion] : dyspnea during exertion [Negative] : Heme/Lymph [Weight Gain (___ Lbs)] : no recent weight gain [Chest Discomfort] : no chest discomfort [Lower Ext Edema] : lower extremity edema [Orthopnea] : no orthopnea [PND] : no PND [Cough] : no cough [Wheezing] : no wheezing [Heartburn] : no heartburn [Change in Appetite] : no change in appetite [Constipation] : no constipation [Blood in Stool] : no blood in stool [FreeTextEntry6] : MUNROE [FreeTextEntry9] : PMR (once her sed rate repeated)

## 2022-07-15 ENCOUNTER — APPOINTMENT (OUTPATIENT)
Dept: VASCULAR SURGERY | Facility: CLINIC | Age: 82
End: 2022-07-15

## 2022-07-15 PROCEDURE — 99441: CPT | Mod: 95

## 2022-07-15 NOTE — PHYSICAL EXAM
[2+] : left 2+ [Ankle Swelling (On Exam)] : present [Ankle Swelling Bilaterally] : severe [Varicose Veins Of Lower Extremities] : present [Varicose Veins Of The Right Leg] : of the right leg [Ankle Swelling On The Right] : mild [Calm] : calm [de-identified] : Well-appearing  [] : not present [de-identified] : EOMI, anicteric  [FreeTextEntry1] : Popliteal and posterior tibial pulses not palpable due to swelling.  [de-identified] : soft, nt, nd  [de-identified] : motor and sensation intact in all four extremities  [de-identified] : no wounds on bilateral feet, weeping from left lateral ankle. [de-identified] : A&Ox4

## 2022-07-15 NOTE — HISTORY OF PRESENT ILLNESS
[FreeTextEntry1] : GIL GRAF is a 82 year old female who presents for evaluation of lower extremity swelling. \par \par Patient states that since she fell two weeks ago, when she broke her ribs, she has been having lower extremity swelling. Since her fall, she has mostly been sedentary on the couch. Denies lower extremity pain, but reports tightness. More recently, three days ago, started noticing fluid coming out of the left leg. She went to urgent care on Saturday and was referred to vascular surgery. No fever or chills. \par Prior to this, had leg swelling after fall and broke her pelvis 2021. After walking, swelling improved. Now recurs. No personal history of DVT but reports mother had a PE (which seems provoked as it occurred in setting of fracture). \par \par Of note, patient reports recent increased shortness of breath with walking. Has been using inhaler prior to walking, which helps. \par \par + PMH: HTN, HLD, osteoporosis, asthma, PMR, depression\par + PSH:cataract surgery, , femur repair, carpal tunnel release, parotidectomy\par + FH: diabetes (mother, sister)\par + SH: never smoker, very limited etoh use\par + Aller: forteo\par \par PCP is Dr. Miller Craig.\par Cardiologist is Dr. Codey Vigil.  [de-identified] : 7/15/2022-When pt presented to ER, underwent CTPA. This was negative for DVT. Patient was then discharged on xarelto 20 mg daily. Her legs are feeling better with slightly diminished swelling. She is keep them elevated when she can but is also ambulating.

## 2022-07-15 NOTE — DATA REVIEWED
[FreeTextEntry1] : 7/15/2022-CT chest reviewed. no evidence of pulmonary embolism. \par \par 7/12/2022–bilateral lower extremity venous duplex\par Right–negative for DVT and SVT. There is reflux noted in the saphenofemoral junction only.\par Left–positive for acute occlusive DVT in the common femoral vein extending into the TP trunk.

## 2022-07-15 NOTE — ASSESSMENT
[FreeTextEntry1] : GIL GRAF is a 82 year old female presents for evaluation. \par \par –Reviewed results of duplex with patient and sister.  There is an acute occlusive DVT in the left common femoral vein extending to the TP trunk.  The tibial veins are poorly visualized due to extensive edema at the calf.\par –Given these findings and reported new onset shortness of breath, will send patient to the emergency room to rule out pulmonary embolism.\par – Explained to the patient and sister the importance of being evaluated for pulmonary embolism and to be started on anticoagulation.  They are in agreement with the plan and will present to [facility] ED for further evaluation.\par – We will plan for telehealth follow-up with patient later this week.

## 2022-07-15 NOTE — DATA REVIEWED
[FreeTextEntry1] : 7/12/2022–bilateral lower extremity venous duplex\par Right–negative for DVT and SVT.  There is reflux noted in the saphenofemoral junction only.\par Left–positive for acute occlusive DVT in the common femoral vein extending into the TP trunk.

## 2022-07-15 NOTE — ASSESSMENT
[FreeTextEntry1] : GIL GRAF is a 82 year old female presents for evaluation. \par \par > LLE DVT\par - LIkely provoked in setting of recent trauma and immobility.\par - PE has been ruled out.\par - Xarelto 15 mg BID x 21 days then transition to Xarelto 20 mg daily for treatment of DVT. Will send new prescription to pharmacy. Anticipate 3 month course of provoked DVT. \par - Follow up in three months.

## 2022-07-15 NOTE — HISTORY OF PRESENT ILLNESS
[FreeTextEntry1] : GIL GRAF is a 82 year old female who presents for evaluation of lower extremity swelling. \par \par Patient states that since she fell two weeks ago, when she broke her ribs, she has been having lower extremity swelling. Since her fall, she has mostly been sedentary on the couch. Denies lower extremity pain, but reports tightness. More recently, three days ago, started noticing fluid coming out of the left leg. She went to urgent care on Saturday and was referred to vascular surgery. No fever or chills. \par Prior to this, had leg swelling after fall and broke her pelvis 2021. After walking, swelling improved. Now recurs. No personal history of DVT but reports mother had a PE (which seems provoked as it occurred in setting of fracture). \par \par Of note, patient reports recent increased shortness of breath with walking. Has been using inhaler prior to walking, which helps. \par \par + PMH: HTN, HLD, osteoporosis, asthma, PMR, depression\par + PSH:cataract surgery, , femur repair, carpal tunnel release, parotidectomy\par + FH: diabetes (mother, sister)\par + SH: never smoker, very limited etoh use\par + Aller: forteo\par \par PCP is Dr. Milelr Craig.\par Cardiologist is Dr. Codey Vigil.

## 2022-07-17 ENCOUNTER — RX RENEWAL (OUTPATIENT)
Age: 82
End: 2022-07-17

## 2022-07-28 ENCOUNTER — APPOINTMENT (OUTPATIENT)
Dept: ORTHOPEDIC SURGERY | Facility: CLINIC | Age: 82
End: 2022-07-28

## 2022-07-28 VITALS — HEIGHT: 58 IN | BODY MASS INDEX: 32.75 KG/M2 | WEIGHT: 156 LBS

## 2022-07-28 DIAGNOSIS — M71.21 SYNOVIAL CYST OF POPLITEAL SPACE [BAKER], RIGHT KNEE: ICD-10-CM

## 2022-07-28 PROCEDURE — 99214 OFFICE O/P EST MOD 30 MIN: CPT | Mod: 25

## 2022-07-28 PROCEDURE — J3490M: CUSTOM

## 2022-07-28 PROCEDURE — 20610 DRAIN/INJ JOINT/BURSA W/O US: CPT | Mod: 50

## 2022-07-28 NOTE — IMAGING
[de-identified] : Constitutional: The patient appears well developed, well nourished. Examination of patients ability to communicate functionally was normal. \par \par Skin: Skin of the head and face is normal without rashes, lesions or ulcers. \par \par Cardiovascular: Peripheral Vascular System is normal. \par \par Neurologic: Alert and oriented to time, place and person. No evidence of mood disorder, calm affect. \par \par Right Knee: Inspection of the knee is as follows: no effusion, erythema, ecchymosis, scars or deformities. Palpation of the knee is as follows: lateral facet of patella tenderness, patellar compression tenderness and crepitus about the patella. Knee Range of Motion is as follows: Extension: 0 degrees. Flexion: 140 degrees. Strength examination of the knee is as follows: Quadriceps strength is 4/5 Hamstring strength is 4/5 Ligament Stability and Special Test ligamentously stable. \par \par Right Hip/Thigh: Inspection of the hip/thigh is as follows: Inspection shows no swelling and no ecchymosis. Range of motion of the hip is as follows: full ROM \par \par Left Knee: Inspection of the knee is as follows: no effusion, erythema, ecchymosis, scars or deformities. Palpation of the knee is as follows: lateral facet of patella tenderness, patellar compression tenderness and crepitus about the patella. Knee Range of Motion is as follows: Extension: 0 degrees. Flexion: 140 degrees. Strength examination of the knee is as follows: Quadriceps strength is 4/5 Hamstring strength is 4/5 Ligament Stability and Special Test ligamentously stable. \par \par Left Hip/Thigh: Inspection of the hip/thigh is as follows: Inspection shows no swelling and no ecchymosis. Range of motion of the hip is as follows: full ROM.

## 2022-07-28 NOTE — ASSESSMENT
[FreeTextEntry1] : 82 year F WITH MODERATE RT KNEE PAIN. PAIN WORSENS WITH STAIRS AND WALKING PROLONGED DISTANCES. PAIN IS AFFECTING ADL AND FUNCTIONAL ACTIVITIES. XRAYS REVIEWED WITH . TREATMENT OPTIONS REVIEWED. RIGHT BAKERS CYST NOTED.

## 2022-07-28 NOTE — PROCEDURE
[de-identified] : Procedure Name: Large Joint Injection / Aspiration: Depomedrol and Marcaine\par Anesthesia: ethyl chloride sprayed topically.. \par Depomedrol: An injection of Depomedrol 80 mg , 1 cc. \par Marcaine: 5 cc. \par Medication was injected in bilateral knees. Patient has tried OTC's including aspirin, Ibuprofen, Aleve etc or prescriptionNSAIDS, and/or exercises at home and/ or physical therapy without satisfactory response. After verbal consent using sterile preparation and technique. The risks, benefits, and alternatives to cortisone injection were explained in full to the patient. Risks outlined include but are not limited to infection, sepsis, bleeding, scarring, skin discoloration, temporary  increase in pain, syncopal episode, failure to resolve symptoms, allergic reaction, symptom recurrence, and elevation of blood sugar in diabetics. Patient understood the risks. All questions were answered. After discussion of options, patient requested an injection. Oral informed consent was obtained and sterile prep was done of the injection site. Sterile technique was utilized for the procedure including the preparation of the solutions used for the injection. Patient tolerated the procedure well. Advised to ice the injection site this evening. Prep with alcohol locally to site. Sterile technique used. Post Procedure Instructions: Patient was advised to call if redness, pain, or fever occur and apply ice for 15 min. out of every hour for the next 12-24 hours as tolerated.

## 2022-07-28 NOTE — HISTORY OF PRESENT ILLNESS
[8] : 8 [4] : 4 [Dull/Aching] : dull/aching [Intermittent] : intermittent [Rest] : rest [Walking] : walking [FreeTextEntry9] : tylenol  [de-identified] : motion

## 2022-07-29 RX ORDER — RIVAROXABAN 15 MG-20MG
15 & 20 KIT ORAL
Qty: 1 | Refills: 0 | Status: DISCONTINUED | COMMUNITY
Start: 2022-07-15 | End: 2022-07-29

## 2022-07-29 RX ORDER — RIVAROXABAN 20 MG/1
20 TABLET, FILM COATED ORAL
Qty: 90 | Refills: 0 | Status: DISCONTINUED | COMMUNITY
Start: 2022-07-15 | End: 2022-07-29

## 2022-08-15 ENCOUNTER — RX RENEWAL (OUTPATIENT)
Age: 82
End: 2022-08-15

## 2022-08-15 ENCOUNTER — APPOINTMENT (OUTPATIENT)
Dept: ORTHOPEDIC SURGERY | Facility: CLINIC | Age: 82
End: 2022-08-15

## 2022-10-25 ENCOUNTER — APPOINTMENT (OUTPATIENT)
Dept: VASCULAR SURGERY | Facility: CLINIC | Age: 82
End: 2022-10-25

## 2022-10-25 PROCEDURE — 93971 EXTREMITY STUDY: CPT | Mod: LT

## 2022-10-26 ENCOUNTER — NON-APPOINTMENT (OUTPATIENT)
Age: 82
End: 2022-10-26

## 2022-10-27 ENCOUNTER — APPOINTMENT (OUTPATIENT)
Dept: ORTHOPEDIC SURGERY | Facility: CLINIC | Age: 82
End: 2022-10-27

## 2022-10-31 ENCOUNTER — APPOINTMENT (OUTPATIENT)
Dept: VASCULAR SURGERY | Facility: CLINIC | Age: 82
End: 2022-10-31

## 2022-11-23 ENCOUNTER — NON-APPOINTMENT (OUTPATIENT)
Age: 82
End: 2022-11-23

## 2022-11-23 ENCOUNTER — APPOINTMENT (OUTPATIENT)
Dept: CARDIOLOGY | Facility: CLINIC | Age: 82
End: 2022-11-23

## 2022-11-23 VITALS
DIASTOLIC BLOOD PRESSURE: 60 MMHG | SYSTOLIC BLOOD PRESSURE: 122 MMHG | BODY MASS INDEX: 32.6 KG/M2 | HEART RATE: 60 BPM | WEIGHT: 156 LBS | OXYGEN SATURATION: 98 %

## 2022-11-23 PROCEDURE — 93000 ELECTROCARDIOGRAM COMPLETE: CPT

## 2022-11-23 PROCEDURE — 36415 COLL VENOUS BLD VENIPUNCTURE: CPT

## 2022-11-23 PROCEDURE — 99215 OFFICE O/P EST HI 40 MIN: CPT

## 2022-11-23 NOTE — REVIEW OF SYSTEMS
[Dyspnea on exertion] : dyspnea during exertion [Lower Ext Edema] : lower extremity edema [Negative] : Heme/Lymph [Weight Gain (___ Lbs)] : no recent weight gain [Weight Loss (___ Lbs)] : no recent weight loss [Chest Discomfort] : no chest discomfort [Orthopnea] : no orthopnea [PND] : no PND [Cough] : no cough [Wheezing] : no wheezing [Heartburn] : no heartburn [Change in Appetite] : no change in appetite [Constipation] : no constipation [Blood in Stool] : no blood in stool [FreeTextEntry6] : MUNROE [FreeTextEntry9] : PMR (once her sed rate repeated)

## 2022-11-23 NOTE — PHYSICAL EXAM
Medical Records from [facility] came and patient was prescribed Gabapentin in March.  Please advise   [Normal Appearance] : normal appearance [General Appearance - Well Developed] : well developed [Well Groomed] : well groomed [General Appearance - Well Nourished] : well nourished [No Deformities] : no deformities [General Appearance - In No Acute Distress] : no acute distress [Eyelids - No Xanthelasma] : the eyelids demonstrated no xanthelasmas [Normal Conjunctiva] : the conjunctiva exhibited no abnormalities [Normal Oral Mucosa] : normal oral mucosa [No Oral Pallor] : no oral pallor [No Oral Cyanosis] : no oral cyanosis [Normal Jugular Venous A Waves Present] : normal jugular venous A waves present [No Jugular Venous Cuevas A Waves] : no jugular venous cuevas A waves [Normal Jugular Venous V Waves Present] : normal jugular venous V waves present [Respiration, Rhythm And Depth] : normal respiratory rhythm and effort [Exaggerated Use Of Accessory Muscles For Inspiration] : no accessory muscle use [Auscultation Breath Sounds / Voice Sounds] : lungs were clear to auscultation bilaterally [Heart Rate And Rhythm] : heart rate and rhythm were normal [Murmurs] : no murmurs present [Heart Sounds] : normal S1 and S2 [Abdomen Soft] : soft [Abdomen Tenderness] : non-tender [Abnormal Walk] : normal gait [Abdomen Mass (___ Cm)] : no abdominal mass palpated [Gait - Sufficient For Exercise Testing] : the gait was sufficient for exercise testing [Nail Clubbing] : no clubbing of the fingernails [Cyanosis, Localized] : no localized cyanosis [Petechial Hemorrhages (___cm)] : no petechial hemorrhages [Skin Color & Pigmentation] : normal skin color and pigmentation [] : no rash [No Venous Stasis] : no venous stasis [No Skin Ulcers] : no skin ulcer [Skin Lesions] : no skin lesions [No Xanthoma] : no  xanthoma was observed [Affect] : the affect was normal [Oriented To Time, Place, And Person] : oriented to person, place, and time [Mood] : the mood was normal [FreeTextEntry1] : anxious

## 2022-11-23 NOTE — HISTORY OF PRESENT ILLNESS
[FreeTextEntry1] : June 20, 2017. 77-year-old woman with diabetes mellitus, hyperlipidemia, hypertension who continues to complain of palpitations when she is stressed. She feels her heart being fast. It lasts for a minute or 2. The palpitations then gradually slowed down. She has no associated symptoms. The patient denies exertional dyspnea or chest pressure. She has no lightheadedness or ankle swelling.\par October 30, 2017. Saw Dr. Rosenstein for blurred vision suddenly (not amaurosis). Normal carotid Doppler 10/26/2017. He noted skipped beats or irregular pulse. Here now, feels fine except occasional  rapid beats. In sinus but occasional PC's on exam, with sx. Recent labs with Cholesterol 161, TG 65, HDL 74, LDL 73. HbA1c 5.8. Normal TFTs. Previous echo with minimal MR, AS?AI. Holter with only symptomatic VPCs.\par August 27, 2018. Here for concern over new edema left leg. Not tender,red, hot, etc. No trauma but has arthritis in that knee and recent failed steroid injection. Concerned it's her heart. Workup negative for cardiac and venous Doppler negative for DVT, but positive for Bakers cyst. It was drained by radiology.\par November 30, 2018. Patient here in followup. Had labs October 5, with normal CMP, fasting glucose 98, cholesterol 178, HDL 84, LDL 80. Normal TSH, normal CBC. Asked about baby aspirin, which she has been taking for years. Had negative carotid Dopplers a few years ago, with no evidence of plaque by neurology. No indication for aspirin, so she will stop it. Complained about trouble sleeping, and once tried Ambien from her internist, and didn't like it, but she will followup with the internist.\par December 26, 2018. Patient called earlier today. She had chest pain over Jules day and is concerned. She's also had worsening of her edema and was told to stop her steroids.\par EKG here is sinus rhythm with APCs, but otherwise within normal limits. \par Actually had symptoms of cough, and her internist gave her both Tamiflu and c and then subsequently gave her a Medrol pack, possibly twice She is better than initially, but still has symptoms of shortness of breath with exertion, especially when she tried to walk. Both her dogs. The other night, and concerned about the edema in her legs. Still has productive cough as well and is concerned about being able to make her flight to [place] on January 6. She also cannot hear out of her left ear because it is clogged\par May 2019. Patient called from [place], anxious that her blood pressure is always in the 140s to 150s over 70. Reassured her but then increased her verapamil from 180 q.12 h. to 240 q.12 h.\par July 29, 2019. Patient here in followup. Remains with sinus bradycardia at 58 with an otherwise normal ECG and no evidence of LVH. Outside readings of her blood pressure, mostly in the 130s occasionally even 120/60-70. She was told by the doctors in [place] of extra beats, but she never notices them. No other complaints except trouble sleeping and needing zolpidem.\par November 7, 2019.  Patient returns in follow-up.  EKG remains in sinus rhythm 60 and unchanged.  Good spirits feeling well blood pressure excellent EKG within normal limits.  Her internist took a CBC along with a sed rate because of her previous history of PMR but all was acceptable.  Her orthopedist wants her to have a vitamin D level and a calcium level because of her history of osteoporosis.  She is a little confused as to whether to remain on extra calcium; she takes 500 with vitamin D.  \par December 5, 2019.  Patient called this morning and came in very upset because of edema of her right leg.  Last week she developed the edema and her internist put her on torsemide 20 mg and potassium 20 mEq.  After 3 days she became very lightheaded and dizzy.  He told her to stop it and then when it return he told her to resume and then cut it in half.  She came in today because she is very worried.  It is obvious that the edema is not related to heart failure and it is very mild.  Torsemide is fairly strong.  She had issues with swelling in the other leg about a year ago when she has had recent injections in her right knee for arthritis and a work-up for possible recurrence of PMR despite a normal sed rate.  There is no shortness of breath PND orthopnea chest pain or change in her other status.\par October 26, 2020.  First visit since December of last year.  Remains in sinus rhythm at 62 with a normal ECG other than poor R wave progression.  Her PMR came back and she is having trouble getting off the prednisone; now on 5 mg daily.  This is closed her blood pressure to be elevated along with the stress from COVID-19 and she is very nervous because she has risk factors.  She asked to have both her sed rate and her blood type checked today.  Flu shot given.\par November 25, 2020.  Patient saw her internist yesterday to get her ears cleaned out and her blood pressure was still high.  She also has some swelling in both shins.  Staying on prednisone 5 mg at least until January when she is supposed to go down to 2.5.  Tolerating the candesartan HCT and no longer has rapid heart racing.  Is on furosemide 20 mg as well and still on verapamil.  No beta-blockers because of her asthma.\par Reviewed labs with patient.  She claims her blood pressure is down to 130 and 135.  She is also down to 2.5 mg on the prednisone.  \par May 12, 2021.  Patient here in follow-up.  Remains in sinus rhythm at 57 with a normal ECG.  Blood pressure here 134/72 on the left arm (she tends to be about 10 points higher on the right.  However she says when she checks her pressure at home she usually checks the left arm and her numbers at home are always high).  She is now on omeprazole because she gets heartburn possibly from her prednisone as she is still on 2.5 mg daily.  She claims if she walks even just a little she is out of breath but no chest pressure.  She is seeing pulmonary tomorrow and I told her I will want her to come back for an echocardiogram as well.  Whether she needs some type of stress test remains to be seen.  She is now on some Lexapro because she got very depressed over Covid.  She is fully vaccinated.  She started Tata Frank diet 2 weeks ago.\par June 24, 2021.  Patient returns for follow-up and echo.  Still complaining of shortness of breath with exertion.  She did the Tata Frank diet and stuck to every aspect of it except the exercise and she actually gained weight.  Had a full pulmonary work-up and was told everything seems okay but she is scheduled for a chest CT in 2 weeks and he wants to wait until those results are in.  She had her echo today which shows normal LV function, no significant valve disease, does not even seem to indicate significant diastolic dysfunction and her RVSP is 39 which is slightly higher only from 2016.  She will be short of breath walking outside weather level or incline but in the house she can go up and down the stairs and have no shortness of breath.  The diuretic helped her edema but she did not notice any change in her dyspnea on exertion.  She is reluctant to do any type of stress test even a pharmacologic nuclear 1 but perhaps a CTA would be worthwhile although I do not have a high suspicion of ischemic disease here.  As far as diagnosing diastolic dysfunction it would require a right heart cath which she is not willing to do and again my suspicion is low.  She was vaccinated but she understands that people on immunosuppressive's like she is for her PMR often do not make antibodies and is still at risk and she asked for her antibodies to be checked.\par December 16, 2021.  Patient here in follow-up.  Remains in sinus rhythm at 60 with her EKG unchanged.  She asked for sed rate and CRP which she then faxes to her rheumatologist and he tells her what to do with the prednisone; currently she is on 2.5 mg.  She is getting depressed with only isolation and anxiety about being immunosuppressed and the new variant.  Her blood pressure is slightly up but did come down somewhat at the end of the exam.  She knows she has to work on her weight as well.\par July 14, 2022.  Telehealth visit. Narrative: The patient fell about 2 weeks ago when she missed a step, fractured some ribs and has been having lower extremity swelling since then being mostly sedentary on the couch. Started to have weeping edema of her left leg and went to [facility] emergency room was found to have acute DVT on the left and was placed on Xarelto 20 mg daily. She has been seeing Dr. Michaela Franco of vascular and they are scheduled for a telehealth visit tomorrow morning. Part of the work-up showed coronary calcification on CT scan. Patient is otherwise on the same medications besides the Xarelto. She is still on verapamil twice a day as well and rosuvastatin. On the right leg she does have reflux in the saphenous Brothers junction while the left leg is positive for an acute occlusive DVT in the common femoral vein extending into the TP trunk. Her blood pressure at the visit to vascular was 134/73. Pulmonary embolism was ruled out in the emergency room.\par Assessment: I reviewed the medications and course with the patient and I believe her sister or daughter. As mentioned she will have a telehealth visit with vascular tomorrow morning. I reinforced the importance of staying on the Xarelto and I will allow vascular to recommend whether she should be on for 3 or 6 months or perhaps lifelong. She will remain on all her other medications. If necessary I can arrange home draw for labs but hopefully she will be able to come to the office in about 2 months once her ribs are healed.\par Plan: Continue current medications which now include Xarelto 20 mg.\par Follow-up: Tomorrow morning telehealth visit with vascular. Follow-up with me in the office 2 months.\par November 23, 2022.  Patient is here in follow-up.  On July 15 she had a televisit with vascular.  There has been a DVT of the left lower extremity femoral vein and he placed her on Xarelto.  On July 28 she had an injection in her knee for arthritis with steroids.  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  Anticoagulation was stopped by orthopedics.  Patient was referred for physical therapy.  Here today she is in sinus rhythm at 61 with a first-degree AV block and otherwise an unremarkable ECG.  Blood pressure excellent, weight unchanged.  Her main complaint is her edema and she does have significant JVD at 90 degrees.\par

## 2022-11-25 ENCOUNTER — APPOINTMENT (OUTPATIENT)
Dept: ORTHOPEDIC SURGERY | Facility: CLINIC | Age: 82
End: 2022-11-25
Payer: MEDICARE

## 2022-11-25 VITALS — WEIGHT: 156 LBS | HEIGHT: 58 IN | BODY MASS INDEX: 32.75 KG/M2

## 2022-11-25 PROCEDURE — J3490N: CUSTOM | Mod: NC

## 2022-11-25 PROCEDURE — 99214 OFFICE O/P EST MOD 30 MIN: CPT | Mod: 25

## 2022-11-25 PROCEDURE — 20610 DRAIN/INJ JOINT/BURSA W/O US: CPT | Mod: 50

## 2022-11-25 NOTE — PROCEDURE
[de-identified] : Procedure Name: Large Joint Injection / Aspiration: Depomedrol and Marcaine\par Anesthesia: ethyl chloride sprayed topically.. \par Depomedrol: An injection of Depomedrol 80 mg , 1 cc. \par Marcaine: 5 cc. \par Medication was injected in bilateral knees. Patient has tried OTC's including aspirin, Ibuprofen, Aleve etc or prescriptionNSAIDS, and/or exercises at home and/ or physical therapy without satisfactory response. After verbal consent using sterile preparation and technique. The risks, benefits, and alternatives to cortisone injection were explained in full to the patient. Risks outlined include but are not limited to infection, sepsis, bleeding, scarring, skin discoloration, temporary  increase in pain, syncopal episode, failure to resolve symptoms, allergic reaction, symptom recurrence, and elevation of blood sugar in diabetics. Patient understood the risks. All questions were answered. After discussion of options, patient requested an injection. Oral informed consent was obtained and sterile prep was done of the injection site. Sterile technique was utilized for the procedure including the preparation of the solutions used for the injection. Patient tolerated the procedure well. Advised to ice the injection site this evening. Prep with alcohol locally to site. Sterile technique used. Post Procedure Instructions: Patient was advised to call if redness, pain, or fever occur and apply ice for 15 min. out of every hour for the next 12-24 hours as tolerated.

## 2022-11-25 NOTE — IMAGING
[de-identified] : Constitutional: The patient appears well developed, well nourished. Examination of patients ability to communicate functionally was normal. \par \par Skin: Skin of the head and face is normal without rashes, lesions or ulcers. \par \par Cardiovascular: Peripheral Vascular System is normal. \par \par Neurologic: Alert and oriented to time, place and person. No evidence of mood disorder, calm affect. \par \par Right Knee: Inspection of the knee is as follows: no effusion, erythema, ecchymosis, scars or deformities. Palpation of the knee is as follows: lateral facet of patella tenderness, patellar compression tenderness and crepitus about the patella. Knee Range of Motion is as follows: Extension: 0 degrees. Flexion: 140 degrees. Strength examination of the knee is as follows: Quadriceps strength is 4/5 Hamstring strength is 4/5 Ligament Stability and Special Test ligamentously stable. \par \par Right Hip/Thigh: Inspection of the hip/thigh is as follows: Inspection shows no swelling and no ecchymosis. Range of motion of the hip is as follows: full ROM \par \par Left Knee: Inspection of the knee is as follows: no effusion, erythema, ecchymosis, scars or deformities. Palpation of the knee is as follows: lateral facet of patella tenderness, patellar compression tenderness and crepitus about the patella. Knee Range of Motion is as follows: Extension: 0 degrees. Flexion: 140 degrees. Strength examination of the knee is as follows: Quadriceps strength is 4/5 Hamstring strength is 4/5 Ligament Stability and Special Test ligamentously stable. \par \par Left Hip/Thigh: Inspection of the hip/thigh is as follows: Inspection shows no swelling and no ecchymosis. Range of motion of the hip is as follows: full ROM.

## 2022-11-25 NOTE — HISTORY OF PRESENT ILLNESS
[8] : 8 [4] : 4 [Dull/Aching] : dull/aching [Intermittent] : intermittent [Rest] : rest [Walking] : walking [] : no [FreeTextEntry9] : tylenol  [de-identified] : motion

## 2022-11-27 LAB
ALBUMIN SERPL ELPH-MCNC: 4.4 G/DL
ALP BLD-CCNC: 79 U/L
ALT SERPL-CCNC: 16 U/L
ANION GAP SERPL CALC-SCNC: 17 MMOL/L
AST SERPL-CCNC: 17 U/L
BASOPHILS # BLD AUTO: 0.07 K/UL
BASOPHILS NFR BLD AUTO: 0.8 %
BILIRUB SERPL-MCNC: 0.5 MG/DL
BUN SERPL-MCNC: 17 MG/DL
CALCIUM SERPL-MCNC: 9.9 MG/DL
CHLORIDE SERPL-SCNC: 96 MMOL/L
CHOLEST SERPL-MCNC: 170 MG/DL
CO2 SERPL-SCNC: 24 MMOL/L
CREAT SERPL-MCNC: 0.52 MG/DL
EGFR: 93 ML/MIN/1.73M2
EOSINOPHIL # BLD AUTO: 0.04 K/UL
EOSINOPHIL NFR BLD AUTO: 0.4 %
ESTIMATED AVERAGE GLUCOSE: 117 MG/DL
GLUCOSE SERPL-MCNC: 92 MG/DL
HBA1C MFR BLD HPLC: 5.7 %
HCT VFR BLD CALC: 38.4 %
HDLC SERPL-MCNC: 61 MG/DL
HGB BLD-MCNC: 12.7 G/DL
IMM GRANULOCYTES NFR BLD AUTO: 0.5 %
LDLC SERPL CALC-MCNC: 89 MG/DL
LYMPHOCYTES # BLD AUTO: 1.4 K/UL
LYMPHOCYTES NFR BLD AUTO: 15.2 %
MAN DIFF?: NORMAL
MCHC RBC-ENTMCNC: 30.6 PG
MCHC RBC-ENTMCNC: 33.1 GM/DL
MCV RBC AUTO: 92.5 FL
MONOCYTES # BLD AUTO: 0.99 K/UL
MONOCYTES NFR BLD AUTO: 10.7 %
NEUTROPHILS # BLD AUTO: 6.66 K/UL
NEUTROPHILS NFR BLD AUTO: 72.4 %
NONHDLC SERPL-MCNC: 108 MG/DL
NT-PROBNP SERPL-MCNC: 134 PG/ML
PLATELET # BLD AUTO: 293 K/UL
POTASSIUM SERPL-SCNC: 4 MMOL/L
PROT SERPL-MCNC: 6.8 G/DL
RBC # BLD: 4.15 M/UL
RBC # FLD: 13.3 %
SODIUM SERPL-SCNC: 137 MMOL/L
TRIGL SERPL-MCNC: 95 MG/DL
TSH SERPL-ACNC: 1.83 UIU/ML
WBC # FLD AUTO: 9.21 K/UL

## 2023-02-21 ENCOUNTER — NON-APPOINTMENT (OUTPATIENT)
Age: 83
End: 2023-02-21

## 2023-02-24 ENCOUNTER — APPOINTMENT (OUTPATIENT)
Dept: ORTHOPEDIC SURGERY | Facility: CLINIC | Age: 83
End: 2023-02-24

## 2023-03-10 ENCOUNTER — APPOINTMENT (OUTPATIENT)
Dept: ORTHOPEDIC SURGERY | Facility: CLINIC | Age: 83
End: 2023-03-10
Payer: MEDICARE

## 2023-03-10 PROCEDURE — 99213 OFFICE O/P EST LOW 20 MIN: CPT | Mod: 25

## 2023-03-10 PROCEDURE — 20610 DRAIN/INJ JOINT/BURSA W/O US: CPT | Mod: 50

## 2023-03-10 PROCEDURE — J3490M: CUSTOM | Mod: NC

## 2023-03-10 NOTE — PROCEDURE
[Right] : of the right [Knee] : knee [Effusion] : effusion [de-identified] : Procedure Name: Large Joint Injection / Aspiration: Depomedrol and Marcaine\par Anesthesia: ethyl chloride sprayed topically.. \par Depomedrol: An injection of Depomedrol 80 mg , 1 cc. \par Marcaine: 5 cc. \par Medication was injected in bilateral knees. Patient has tried OTC's including aspirin, Ibuprofen, Aleve etc or prescriptionNSAIDS, and/or exercises at home and/ or physical therapy without satisfactory response. After verbal consent using sterile preparation and technique. The risks, benefits, and alternatives to cortisone injection were explained in full to the patient. Risks outlined include but are not limited to infection, sepsis, bleeding, scarring, skin discoloration, temporary  increase in pain, syncopal episode, failure to resolve symptoms, allergic reaction, symptom recurrence, and elevation of blood sugar in diabetics. Patient understood the risks. All questions were answered. After discussion of options, patient requested an injection. Oral informed consent was obtained and sterile prep was done of the injection site. Sterile technique was utilized for the procedure including the preparation of the solutions used for the injection. Patient tolerated the procedure well. Advised to ice the injection site this evening. Prep with alcohol locally to site. Sterile technique used. Post Procedure Instructions: Patient was advised to call if redness, pain, or fever occur and apply ice for 15 min. out of every hour for the next 12-24 hours as tolerated.  [de-identified] : 5cc [de-identified] : [facility]

## 2023-03-10 NOTE — HISTORY OF PRESENT ILLNESS
[8] : 8 [4] : 4 [Dull/Aching] : dull/aching [Intermittent] : intermittent [Rest] : rest [Walking] : walking [de-identified] : 3/8/23: F/U B/L Knees- R>L PAIN HAS INCREASED SINCE csi GIVEN LAST VISIT.  [] : no [FreeTextEntry1] : b/l KNEES [FreeTextEntry9] : tylenol  [de-identified] : motion

## 2023-03-10 NOTE — ASSESSMENT
[FreeTextEntry1] : 82 year F WITH MODERATE B/L KNEE OA. PAIN WORSENS WITH STAIRS AND WALKING PROLONGED DISTANCES. PAIN IS AFFECTING ADL AND FUNCTIONAL ACTIVITIES. TREATMENT OPTIONS REVIEWED. QUESTIONS ANSWERED. \par \par B/L CSI TODAY. PATIENT TOLERATED INJECTION WELL.

## 2023-03-10 NOTE — IMAGING
[de-identified] : Constitutional: The patient appears well developed, well nourished. Examination of patients ability to communicate functionally was normal. \par \par Skin: Skin of the head and face is normal without rashes, lesions or ulcers. \par \par Cardiovascular: Peripheral Vascular System is normal. \par \par Neurologic: Alert and oriented to time, place and person. No evidence of mood disorder, calm affect. \par \par Right Knee: Inspection of the knee is as follows: no effusion, erythema, ecchymosis, scars or deformities. Palpation of the knee is as follows: lateral facet of patella tenderness, patellar compression tenderness and crepitus about the patella. Knee Range of Motion is as follows: Extension: 0 degrees. Flexion: 140 degrees. Strength examination of the knee is as follows: Quadriceps strength is 4/5 Hamstring strength is 4/5 Ligament Stability and Special Test ligamentously stable. \par \par Right Hip/Thigh: Inspection of the hip/thigh is as follows: Inspection shows no swelling and no ecchymosis. Range of motion of the hip is as follows: full ROM \par \par Left Knee: Inspection of the knee is as follows: no effusion, erythema, ecchymosis, scars or deformities. Palpation of the knee is as follows: lateral facet of patella tenderness, patellar compression tenderness and crepitus about the patella. Knee Range of Motion is as follows: Extension: 0 degrees. Flexion: 140 degrees. Strength examination of the knee is as follows: Quadriceps strength is 4/5 Hamstring strength is 4/5 Ligament Stability and Special Test ligamentously stable. \par \par Left Hip/Thigh: Inspection of the hip/thigh is as follows: Inspection shows no swelling and no ecchymosis. Range of motion of the hip is as follows: full ROM.

## 2023-05-23 ENCOUNTER — APPOINTMENT (OUTPATIENT)
Dept: CARDIOLOGY | Facility: CLINIC | Age: 83
End: 2023-05-23
Payer: MEDICARE

## 2023-05-23 ENCOUNTER — NON-APPOINTMENT (OUTPATIENT)
Age: 83
End: 2023-05-23

## 2023-05-23 VITALS
WEIGHT: 156 LBS | DIASTOLIC BLOOD PRESSURE: 68 MMHG | SYSTOLIC BLOOD PRESSURE: 167 MMHG | OXYGEN SATURATION: 94 % | HEART RATE: 60 BPM | BODY MASS INDEX: 32.75 KG/M2 | HEIGHT: 58 IN

## 2023-05-23 VITALS — HEART RATE: 58 BPM | DIASTOLIC BLOOD PRESSURE: 58 MMHG | SYSTOLIC BLOOD PRESSURE: 132 MMHG

## 2023-05-23 DIAGNOSIS — M35.3 POLYMYALGIA RHEUMATICA: ICD-10-CM

## 2023-05-23 PROCEDURE — 36415 COLL VENOUS BLD VENIPUNCTURE: CPT

## 2023-05-23 PROCEDURE — 93000 ELECTROCARDIOGRAM COMPLETE: CPT

## 2023-05-23 PROCEDURE — 99214 OFFICE O/P EST MOD 30 MIN: CPT

## 2023-05-23 NOTE — REASON FOR VISIT
[FreeTextEntry1] : The patient is a 83-year-old woman with a history of hypertension, hypercholesterolemia, and carotid stenosis for follow up visit still with PMR,  DVT being followed by vascular Dr. Michaela Franco.

## 2023-05-23 NOTE — HISTORY OF PRESENT ILLNESS
[FreeTextEntry1] : June 20, 2017. 77-year-old woman with diabetes mellitus, hyperlipidemia, hypertension who continues to complain of palpitations when she is stressed. She feels her heart being fast. It lasts for a minute or 2. The palpitations then gradually slowed down. She has no associated symptoms. The patient denies exertional dyspnea or chest pressure. She has no lightheadedness or ankle swelling.\par October 30, 2017. Saw Dr. Rosenstein for blurred vision suddenly (not amaurosis). Normal carotid Doppler 10/26/2017. He noted skipped beats or irregular pulse. Here now, feels fine except occasional  rapid beats. In sinus but occasional PC's on exam, with sx. Recent labs with Cholesterol 161, TG 65, HDL 74, LDL 73. HbA1c 5.8. Normal TFTs. Previous echo with minimal MR, AS?AI. Holter with only symptomatic VPCs.\par August 27, 2018. Here for concern over new edema left leg. Not tender,red, hot, etc. No trauma but has arthritis in that knee and recent failed steroid injection. Concerned it's her heart. Workup negative for cardiac and venous Doppler negative for DVT, but positive for Bakers cyst. It was drained by radiology.\par November 30, 2018. Patient here in followup. Had labs October 5, with normal CMP, fasting glucose 98, cholesterol 178, HDL 84, LDL 80. Normal TSH, normal CBC. Asked about baby aspirin, which she has been taking for years. Had negative carotid Dopplers a few years ago, with no evidence of plaque by neurology. No indication for aspirin, so she will stop it. Complained about trouble sleeping, and once tried Ambien from her internist, and didn't like it, but she will followup with the internist.\par December 26, 2018. Patient called earlier today. She had chest pain over Jules day and is concerned. She's also had worsening of her edema and was told to stop her steroids.\par EKG here is sinus rhythm with APCs, but otherwise within normal limits. \par Actually had symptoms of cough, and her internist gave her both Tamiflu and c and then subsequently gave her a Medrol pack, possibly twice She is better than initially, but still has symptoms of shortness of breath with exertion, especially when she tried to walk. Both her dogs. The other night, and concerned about the edema in her legs. Still has productive cough as well and is concerned about being able to make her flight to [place] on January 6. She also cannot hear out of her left ear because it is clogged\par May 2019. Patient called from [place], anxious that her blood pressure is always in the 140s to 150s over 70. Reassured her but then increased her verapamil from 180 q.12 h. to 240 q.12 h.\par July 29, 2019. Patient here in followup. Remains with sinus bradycardia at 58 with an otherwise normal ECG and no evidence of LVH. Outside readings of her blood pressure, mostly in the 130s occasionally even 120/60-70. She was told by the doctors in [place] of extra beats, but she never notices them. No other complaints except trouble sleeping and needing zolpidem.\par November 7, 2019.  Patient returns in follow-up.  EKG remains in sinus rhythm 60 and unchanged.  Good spirits feeling well blood pressure excellent EKG within normal limits.  Her internist took a CBC along with a sed rate because of her previous history of PMR but all was acceptable.  Her orthopedist wants her to have a vitamin D level and a calcium level because of her history of osteoporosis.  She is a little confused as to whether to remain on extra calcium; she takes 500 with vitamin D.  \par December 5, 2019.  Patient called this morning and came in very upset because of edema of her right leg.  Last week she developed the edema and her internist put her on torsemide 20 mg and potassium 20 mEq.  After 3 days she became very lightheaded and dizzy.  He told her to stop it and then when it return he told her to resume and then cut it in half.  She came in today because she is very worried.  It is obvious that the edema is not related to heart failure and it is very mild.  Torsemide is fairly strong.  She had issues with swelling in the other leg about a year ago when she has had recent injections in her right knee for arthritis and a work-up for possible recurrence of PMR despite a normal sed rate.  There is no shortness of breath PND orthopnea chest pain or change in her other status.\par October 26, 2020.  First visit since December of last year.  Remains in sinus rhythm at 62 with a normal ECG other than poor R wave progression.  Her PMR came back and she is having trouble getting off the prednisone; now on 5 mg daily.  This is closed her blood pressure to be elevated along with the stress from COVID-19 and she is very nervous because she has risk factors.  She asked to have both her sed rate and her blood type checked today.  Flu shot given.\par November 25, 2020.  Patient saw her internist yesterday to get her ears cleaned out and her blood pressure was still high.  She also has some swelling in both shins.  Staying on prednisone 5 mg at least until January when she is supposed to go down to 2.5.  Tolerating the candesartan HCT and no longer has rapid heart racing.  Is on furosemide 20 mg as well and still on verapamil.  No beta-blockers because of her asthma.\par Reviewed labs with patient.  She claims her blood pressure is down to 130 and 135.  She is also down to 2.5 mg on the prednisone.  \par May 12, 2021.  Patient here in follow-up.  Remains in sinus rhythm at 57 with a normal ECG.  Blood pressure here 134/72 on the left arm (she tends to be about 10 points higher on the right.  However she says when she checks her pressure at home she usually checks the left arm and her numbers at home are always high).  She is now on omeprazole because she gets heartburn possibly from her prednisone as she is still on 2.5 mg daily.  She claims if she walks even just a little she is out of breath but no chest pressure.  She is seeing pulmonary tomorrow and I told her I will want her to come back for an echocardiogram as well.  Whether she needs some type of stress test remains to be seen.  She is now on some Lexapro because she got very depressed over Covid.  She is fully vaccinated.  She started Tata Frank diet 2 weeks ago.\par June 24, 2021.  Patient returns for follow-up and echo.  Still complaining of shortness of breath with exertion.  She did the Tata Frank diet and stuck to every aspect of it except the exercise and she actually gained weight.  Had a full pulmonary work-up and was told everything seems okay but she is scheduled for a chest CT in 2 weeks and he wants to wait until those results are in.  She had her echo today which shows normal LV function, no significant valve disease, does not even seem to indicate significant diastolic dysfunction and her RVSP is 39 which is slightly higher only from 2016.  She will be short of breath walking outside weather level or incline but in the house she can go up and down the stairs and have no shortness of breath.  The diuretic helped her edema but she did not notice any change in her dyspnea on exertion.  She is reluctant to do any type of stress test even a pharmacologic nuclear 1 but perhaps a CTA would be worthwhile although I do not have a high suspicion of ischemic disease here.  As far as diagnosing diastolic dysfunction it would require a right heart cath which she is not willing to do and again my suspicion is low.  She was vaccinated but she understands that people on immunosuppressive's like she is for her PMR often do not make antibodies and is still at risk and she asked for her antibodies to be checked.\par December 16, 2021.  Patient here in follow-up.  Remains in sinus rhythm at 60 with her EKG unchanged.  She asked for sed rate and CRP which she then faxes to her rheumatologist and he tells her what to do with the prednisone; currently she is on 2.5 mg.  She is getting depressed with only isolation and anxiety about being immunosuppressed and the new variant.  Her blood pressure is slightly up but did come down somewhat at the end of the exam.  She knows she has to work on her weight as well.\par July 14, 2022.  Telehealth visit. Narrative: The patient fell about 2 weeks ago when she missed a step, fractured some ribs and has been having lower extremity swelling since then being mostly sedentary on the couch. Started to have weeping edema of her left leg and went to [facility] emergency room was found to have acute DVT on the left and was placed on Xarelto 20 mg daily. She has been seeing Dr. Michaela Franco of vascular and they are scheduled for a telehealth visit tomorrow morning. Part of the work-up showed coronary calcification on CT scan. Patient is otherwise on the same medications besides the Xarelto. She is still on verapamil twice a day as well and rosuvastatin. On the right leg she does have reflux in the saphenous Brothers junction while the left leg is positive for an acute occlusive DVT in the common femoral vein extending into the TP trunk. Her blood pressure at the visit to vascular was 134/73. Pulmonary embolism was ruled out in the emergency room.\par Assessment: I reviewed the medications and course with the patient and I believe her sister or daughter. As mentioned she will have a telehealth visit with vascular tomorrow morning. I reinforced the importance of staying on the Xarelto and I will allow vascular to recommend whether she should be on for 3 or 6 months or perhaps lifelong. She will remain on all her other medications. If necessary I can arrange home draw for labs but hopefully she will be able to come to the office in about 2 months once her ribs are healed.\par Plan: Continue current medications which now include Xarelto 20 mg.\par Follow-up: Tomorrow morning telehealth visit with vascular. Follow-up with me in the office 2 months.\par November 23, 2022.  Patient is here in follow-up.  On July 15 she had a televisit with vascular.  There has been a DVT of the left lower extremity femoral vein and he placed her on Xarelto.  On July 28 she had an injection in her knee for arthritis with steroids.  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  Anticoagulation was stopped by orthopedics.  Patient was referred for physical therapy.  Here today she is in sinus rhythm at 61 with a first-degree AV block and otherwise an unremarkable ECG.  Blood pressure excellent, weight unchanged.  Her main complaint is her edema and she does have significant JVD at 90 degrees.\par May 23, 2023.  Patient returns in follow-up.  Initially no complaints just asked for a sed rate but then mentioned about shortness of breath with exertion; if she walks regularly she is fine but if she tries to walk fast she gets out of breath.  No chest pain tightness heaviness.  Usually her inhaler helps her.  Meanwhile she had a follow-up CT for her lung nodules in April 28 and she was concerned about prominent coronary artery calcification and I reassured her in the meantime.  Lung nodule seems to be stable.  No interval hospitalizations COVID etc.  Pretty good spirits.  Initial blood pressure high but at the end of the exam it did come back down to normal.

## 2023-05-23 NOTE — REVIEW OF SYSTEMS
[Dyspnea on exertion] : dyspnea during exertion [Negative] : Heme/Lymph [Weight Gain (___ Lbs)] : no recent weight gain [Weight Loss (___ Lbs)] : no recent weight loss [Chest Discomfort] : no chest discomfort [Lower Ext Edema] : no extremity edema [Orthopnea] : no orthopnea [PND] : no PND [Cough] : no cough [Wheezing] : no wheezing [Heartburn] : no heartburn [Change in Appetite] : no change in appetite [Constipation] : no constipation [Blood in Stool] : no blood in stool [FreeTextEntry6] : MUNROE [FreeTextEntry9] : PMR (wants her sed rate repeated)

## 2023-05-24 ENCOUNTER — RX RENEWAL (OUTPATIENT)
Age: 83
End: 2023-05-24

## 2023-05-24 LAB
ALBUMIN SERPL ELPH-MCNC: 4.4 G/DL
ALP BLD-CCNC: 59 U/L
ALT SERPL-CCNC: 17 U/L
ANION GAP SERPL CALC-SCNC: 16 MMOL/L
AST SERPL-CCNC: 16 U/L
BILIRUB SERPL-MCNC: 0.6 MG/DL
BUN SERPL-MCNC: 13 MG/DL
CALCIUM SERPL-MCNC: 9.8 MG/DL
CHLORIDE SERPL-SCNC: 95 MMOL/L
CHOLEST SERPL-MCNC: 174 MG/DL
CO2 SERPL-SCNC: 24 MMOL/L
CREAT SERPL-MCNC: 0.49 MG/DL
EGFR: 94 ML/MIN/1.73M2
ERYTHROCYTE [SEDIMENTATION RATE] IN BLOOD BY WESTERGREN METHOD: 28 MM/HR
ESTIMATED AVERAGE GLUCOSE: 117 MG/DL
GLUCOSE SERPL-MCNC: 87 MG/DL
HBA1C MFR BLD HPLC: 5.7 %
HDLC SERPL-MCNC: 78 MG/DL
LDLC SERPL CALC-MCNC: 76 MG/DL
NONHDLC SERPL-MCNC: 96 MG/DL
NT-PROBNP SERPL-MCNC: 144 PG/ML
POTASSIUM SERPL-SCNC: 4 MMOL/L
PROT SERPL-MCNC: 6.4 G/DL
SODIUM SERPL-SCNC: 134 MMOL/L
TRIGL SERPL-MCNC: 98 MG/DL
TSH SERPL-ACNC: 1.65 UIU/ML

## 2023-06-23 ENCOUNTER — APPOINTMENT (OUTPATIENT)
Dept: ORTHOPEDIC SURGERY | Facility: CLINIC | Age: 83
End: 2023-06-23
Payer: MEDICARE

## 2023-06-23 VITALS — HEIGHT: 58 IN | WEIGHT: 153 LBS | BODY MASS INDEX: 32.12 KG/M2

## 2023-06-23 PROCEDURE — J3490M: CUSTOM | Mod: NC

## 2023-06-23 PROCEDURE — 99213 OFFICE O/P EST LOW 20 MIN: CPT | Mod: 25

## 2023-06-23 PROCEDURE — 20610 DRAIN/INJ JOINT/BURSA W/O US: CPT | Mod: 50

## 2023-06-23 NOTE — PROCEDURE
[de-identified] : Procedure Name: Large Joint Injection / Aspiration: Depomedrol and Marcaine\par Anesthesia: ethyl chloride sprayed topically.. \par Depomedrol: An injection of Depomedrol 80 mg , 1 cc. \par Marcaine: 5 cc. \par Medication was injected in bilateral knees. Patient has tried OTC's including aspirin, Ibuprofen, Aleve etc or prescriptionNSAIDS, and/or exercises at home and/ or physical therapy without satisfactory response. After verbal consent using sterile preparation and technique. The risks, benefits, and alternatives to cortisone injection were explained in full to the patient. Risks outlined include but are not limited to infection, sepsis, bleeding, scarring, skin discoloration, temporary  increase in pain, syncopal episode, failure to resolve symptoms, allergic reaction, symptom recurrence, and elevation of blood sugar in diabetics. Patient understood the risks. All questions were answered. After discussion of options, patient requested an injection. Oral informed consent was obtained and sterile prep was done of the injection site. Sterile technique was utilized for the procedure including the preparation of the solutions used for the injection. Patient tolerated the procedure well. Advised to ice the injection site this evening. Prep with alcohol locally to site. Sterile technique used. Post Procedure Instructions: Patient was advised to call if redness, pain, or fever occur and apply ice for 15 min. out of every hour for the next 12-24 hours as tolerated.

## 2023-06-23 NOTE — ASSESSMENT
[FreeTextEntry1] : 83 year F WITH MODERATE B/L KNEE OA. PAIN WORSENS WITH STAIRS AND WALKING PROLONGED DISTANCES. PAIN IS AFFECTING ADL AND FUNCTIONAL ACTIVITIES. TREATMENT OPTIONS REVIEWED. QUESTIONS ANSWERED. PT RX. \par \par B/L KNEE CSI TODAY. PATIENT TOLERATED INJECTION WELL.

## 2023-06-23 NOTE — IMAGING
[de-identified] : Constitutional: The patient appears well developed, well nourished. Examination of patients ability to communicate functionally was normal. \par \par Skin: Skin of the head and face is normal without rashes, lesions or ulcers. \par \par Cardiovascular: Peripheral Vascular System is normal. \par \par Neurologic: Alert and oriented to time, place and person. No evidence of mood disorder, calm affect. \par \par Right Knee: Inspection of the knee is as follows: no effusion, erythema, ecchymosis, scars or deformities. Palpation of the knee is as follows: lateral facet of patella tenderness, patellar compression tenderness and crepitus about the patella. Knee Range of Motion is as follows: Extension: 0 degrees. Flexion: 140 degrees. Strength examination of the knee is as follows: Quadriceps strength is 4/5 Hamstring strength is 4/5 Ligament Stability and Special Test ligamentously stable. \par \par Right Hip/Thigh: Inspection of the hip/thigh is as follows: Inspection shows no swelling and no ecchymosis. Range of motion of the hip is as follows: full ROM \par \par Left Knee: Inspection of the knee is as follows: no effusion, erythema, ecchymosis, scars or deformities. Palpation of the knee is as follows: lateral facet of patella tenderness, patellar compression tenderness and crepitus about the patella. Knee Range of Motion is as follows: Extension: 0 degrees. Flexion: 140 degrees. Strength examination of the knee is as follows: Quadriceps strength is 4/5 Hamstring strength is 4/5 Ligament Stability and Special Test ligamentously stable. \par \par Left Hip/Thigh: Inspection of the hip/thigh is as follows: Inspection shows no swelling and no ecchymosis. Range of motion of the hip is as follows: full ROM.

## 2023-08-18 ENCOUNTER — APPOINTMENT (OUTPATIENT)
Dept: ORTHOPEDIC SURGERY | Facility: CLINIC | Age: 83
End: 2023-08-18

## 2023-09-03 ENCOUNTER — APPOINTMENT (OUTPATIENT)
Dept: ORTHOPEDIC SURGERY | Facility: CLINIC | Age: 83
End: 2023-09-03
Payer: MEDICARE

## 2023-09-03 VITALS — WEIGHT: 152 LBS | BODY MASS INDEX: 31.91 KG/M2 | HEIGHT: 58 IN

## 2023-09-03 PROCEDURE — 99213 OFFICE O/P EST LOW 20 MIN: CPT

## 2023-09-03 PROCEDURE — 99203 OFFICE O/P NEW LOW 30 MIN: CPT

## 2023-09-03 PROCEDURE — 72170 X-RAY EXAM OF PELVIS: CPT

## 2023-09-03 NOTE — ASSESSMENT
[FreeTextEntry1] : A/P R LC1 pelvis fracture - WBAT w walker - pain medication - NSAIDS - ice - CT pelvis r/o sacral fracture - f/u hip surgeon

## 2023-09-03 NOTE — HISTORY OF PRESENT ILLNESS
[Sudden] : sudden [10] : 10 [8] : 8 [Dull/Aching] : dull/aching [Sharp] : sharp [Stabbing] : stabbing [Constant] : constant [Nothing helps with pain getting better] : Nothing helps with pain getting better [Standing] : standing [de-identified] : 84 y/o F with R hip pain after fall last night. denies numbness/tingling. She has tried tylenol without relief. Ambulates with walker. s/p B/L hip CMN. [FreeTextEntry1] : pelvis  [] : no [FreeTextEntry7] : wraps around her right side  [de-identified] : motion  [de-identified] : none

## 2023-09-03 NOTE — IMAGING
[de-identified] : PE R hip: ambulates with antalgic gait, able to SLR, painful ROM, no pain with passive motion, NVI distally, calves soft, NT. no tenderness at sacrum [AP] : anteroposterior [Fracture] : Fracture [de-identified] : R LC1 pelvic fractures

## 2023-09-07 ENCOUNTER — APPOINTMENT (OUTPATIENT)
Dept: CT IMAGING | Facility: CLINIC | Age: 83
End: 2023-09-07
Payer: MEDICARE

## 2023-09-07 PROCEDURE — 76376 3D RENDER W/INTRP POSTPROCES: CPT

## 2023-09-07 PROCEDURE — 72192 CT PELVIS W/O DYE: CPT | Mod: MH

## 2023-09-13 ENCOUNTER — APPOINTMENT (OUTPATIENT)
Dept: ORTHOPEDIC SURGERY | Facility: CLINIC | Age: 83
End: 2023-09-13
Payer: MEDICARE

## 2023-09-13 VITALS — WEIGHT: 152 LBS | BODY MASS INDEX: 31.91 KG/M2 | HEIGHT: 58 IN

## 2023-09-13 PROCEDURE — 99214 OFFICE O/P EST MOD 30 MIN: CPT

## 2023-10-10 ENCOUNTER — NON-APPOINTMENT (OUTPATIENT)
Age: 83
End: 2023-10-10

## 2023-10-13 ENCOUNTER — APPOINTMENT (OUTPATIENT)
Dept: ORTHOPEDIC SURGERY | Facility: CLINIC | Age: 83
End: 2023-10-13
Payer: MEDICARE

## 2023-10-13 PROCEDURE — 72170 X-RAY EXAM OF PELVIS: CPT

## 2023-10-13 PROCEDURE — 99214 OFFICE O/P EST MOD 30 MIN: CPT

## 2023-11-07 ENCOUNTER — NON-APPOINTMENT (OUTPATIENT)
Age: 83
End: 2023-11-07

## 2023-11-07 ENCOUNTER — APPOINTMENT (OUTPATIENT)
Dept: CARDIOLOGY | Facility: CLINIC | Age: 83
End: 2023-11-07
Payer: MEDICARE

## 2023-11-07 VITALS
OXYGEN SATURATION: 97 % | DIASTOLIC BLOOD PRESSURE: 73 MMHG | HEART RATE: 61 BPM | BODY MASS INDEX: 31.35 KG/M2 | SYSTOLIC BLOOD PRESSURE: 125 MMHG | WEIGHT: 150 LBS

## 2023-11-07 DIAGNOSIS — R60.0 LOCALIZED EDEMA: ICD-10-CM

## 2023-11-07 PROCEDURE — 93000 ELECTROCARDIOGRAM COMPLETE: CPT

## 2023-11-07 PROCEDURE — 99214 OFFICE O/P EST MOD 30 MIN: CPT

## 2023-11-08 LAB
ANION GAP SERPL CALC-SCNC: 13 MMOL/L
BUN SERPL-MCNC: 28 MG/DL
CALCIUM SERPL-MCNC: 9.9 MG/DL
CHLORIDE SERPL-SCNC: 97 MMOL/L
CO2 SERPL-SCNC: 23 MMOL/L
CREAT SERPL-MCNC: 0.72 MG/DL
EGFR: 83 ML/MIN/1.73M2
POTASSIUM SERPL-SCNC: 4.4 MMOL/L
SODIUM SERPL-SCNC: 132 MMOL/L

## 2023-11-13 ENCOUNTER — APPOINTMENT (OUTPATIENT)
Dept: VASCULAR SURGERY | Facility: CLINIC | Age: 83
End: 2023-11-13
Payer: MEDICARE

## 2023-11-13 VITALS
TEMPERATURE: 97.6 F | HEIGHT: 58 IN | WEIGHT: 149 LBS | OXYGEN SATURATION: 95 % | BODY MASS INDEX: 31.28 KG/M2 | HEART RATE: 57 BPM | SYSTOLIC BLOOD PRESSURE: 132 MMHG | DIASTOLIC BLOOD PRESSURE: 75 MMHG | RESPIRATION RATE: 17 BRPM

## 2023-11-13 PROCEDURE — 99213 OFFICE O/P EST LOW 20 MIN: CPT

## 2023-11-16 ENCOUNTER — APPOINTMENT (OUTPATIENT)
Dept: ORTHOPEDIC SURGERY | Facility: CLINIC | Age: 83
End: 2023-11-16

## 2023-12-01 ENCOUNTER — OUTPATIENT (OUTPATIENT)
Dept: OUTPATIENT SERVICES | Facility: HOSPITAL | Age: 83
LOS: 1 days | Discharge: HOME | End: 2023-12-01

## 2023-12-01 DIAGNOSIS — I82.401 ACUTE EMBOLISM AND THROMBOSIS OF UNSPECIFIED DEEP VEINS OF RIGHT LOWER EXTREMITY: ICD-10-CM

## 2023-12-04 ENCOUNTER — APPOINTMENT (OUTPATIENT)
Dept: HEMATOLOGY ONCOLOGY | Facility: CLINIC | Age: 83
End: 2023-12-04
Payer: MEDICARE

## 2023-12-04 VITALS
WEIGHT: 149.91 LBS | SYSTOLIC BLOOD PRESSURE: 145 MMHG | BODY MASS INDEX: 32.34 KG/M2 | TEMPERATURE: 98.2 F | OXYGEN SATURATION: 98 % | RESPIRATION RATE: 16 BRPM | HEIGHT: 56.89 IN | DIASTOLIC BLOOD PRESSURE: 70 MMHG | HEART RATE: 72 BPM

## 2023-12-04 PROCEDURE — 99204 OFFICE O/P NEW MOD 45 MIN: CPT

## 2023-12-10 ENCOUNTER — RX RENEWAL (OUTPATIENT)
Age: 83
End: 2023-12-10

## 2023-12-13 LAB
B2 GLYCOPROT1 IGG SER-ACNC: <5 SGU
CARDIOLIPIN IGM SER-MCNC: 14.4 MPL
CARDIOLIPIN IGM SER-MCNC: <5 GPL
CONFIRM: 73.5 SEC
DRVVT 1H NP PPP: 72.3 SEC
DRVVT IMM 1:2 NP PPP: NORMAL
DRVVT SCREEN TO CONFIRM RATIO: 0.99 RATIO
SCREEN DRVVT: 86.5 SEC
SILICA CLOTTING TIME INTERPRETATION: NORMAL
SILICA CLOTTING TIME S/C: 0.92 RATIO

## 2023-12-14 ENCOUNTER — APPOINTMENT (OUTPATIENT)
Dept: ORTHOPEDIC SURGERY | Facility: CLINIC | Age: 83
End: 2023-12-14

## 2024-02-01 ENCOUNTER — APPOINTMENT (OUTPATIENT)
Dept: CARDIOLOGY | Facility: CLINIC | Age: 84
End: 2024-02-01
Payer: MEDICARE

## 2024-02-01 ENCOUNTER — NON-APPOINTMENT (OUTPATIENT)
Age: 84
End: 2024-02-01

## 2024-02-01 VITALS
HEART RATE: 58 BPM | SYSTOLIC BLOOD PRESSURE: 138 MMHG | OXYGEN SATURATION: 100 % | WEIGHT: 150 LBS | BODY MASS INDEX: 32.59 KG/M2 | DIASTOLIC BLOOD PRESSURE: 58 MMHG

## 2024-02-01 DIAGNOSIS — R73.09 OTHER ABNORMAL GLUCOSE: ICD-10-CM

## 2024-02-01 DIAGNOSIS — I27.20 PULMONARY HYPERTENSION, UNSPECIFIED: ICD-10-CM

## 2024-02-01 DIAGNOSIS — R06.00 DYSPNEA, UNSPECIFIED: ICD-10-CM

## 2024-02-01 PROCEDURE — 99214 OFFICE O/P EST MOD 30 MIN: CPT

## 2024-02-01 PROCEDURE — 93000 ELECTROCARDIOGRAM COMPLETE: CPT

## 2024-02-01 PROCEDURE — G2211 COMPLEX E/M VISIT ADD ON: CPT

## 2024-02-01 RX ORDER — RIVAROXABAN 20 MG/1
20 TABLET, FILM COATED ORAL
Qty: 30 | Refills: 2 | Status: DISCONTINUED | COMMUNITY
Start: 2022-07-29 | End: 2024-02-01

## 2024-02-01 RX ORDER — SPIRONOLACTONE 25 MG/1
25 TABLET ORAL
Qty: 30 | Refills: 0 | Status: DISCONTINUED | COMMUNITY
Start: 2022-11-27 | End: 2024-02-01

## 2024-02-01 NOTE — ASSESSMENT
[FreeTextEntry1] : Her edema is probably mild venous insufficiency or related to some inflammation in her knee and leg or perhaps fluid retention from her prednisone. It was under pretty good control on just furosemide 20 mg daily. 2 visits ago she had virtually no edema whatsoever. The edema was back last visit and she had been off her furosemide; concerning and surprising was significant JVD. Her last echo 6/2021 did have an RVSP of 39 despite what looked like normal LV and RV function and no diastolic dysfunction. Hard to assess her diastolic function based on the numbers. We always could try SGLT2 inhibitors and spironolactone if necessary we will just resume her furosemide. However her proBNP came back totally normal at 134. She returned May 23 and has absolutely no edema or JVD. She did bring up her shortness of breath with exertion which is mostly if she tries to walk fast; no exertional chest pressure or tightness and her breathing seems to be better with her inhaler. Her EKG and exam are okay otherwise. No symptomatic VPCs. No murmur on exam. Tried to reassure in terms of her anxiety. (Problems with osteoporosis but Fosamax in the past cause her to have fractures in her femur and she has 2 rods. Endocrinology wanted to put her on Prolia but she refused and when she did try she had an allergic reaction. Not clear that she benefits from taking exogenous calcium. The orthopedist had her go already for a calcium level and a vitamin D level. She is not on calcium but she is now on omeprazole 40 mg because of heartburn. Perhaps that is from the constant prednisone even though it is a low dose.) Patient returned November 7, 2023. She called me after she fractured her pelvis in October and was complaining about edema so I put her on Aldactazide 25/25 every other day and gave her an appointment to see me today for follow-up and blood test. In the interim she developed a DVT and is on Xarelto. Dr. Garcia sent her to vascular who wants her to have a work-up for clotting with a hematologist and she wanted me to give her the name of hematologist so I gave her 2. The medicine they want for her osteoporosis does have a risk for heart attack and stroke and I am not sure if she is an ideal candidate especially if she does turn out to have a clotting disorder. Labs were sent on the Aldactazide and were acceptable  Patient returns today February 1, 2024 and it is very frustrating that she stopped the spironolactone HCT on her own when she had no edema and now has edema.  She may have taken the furosemide occasionally but she cannot be exact and I do not even know who gave her the furosemide; possibly Dr. Garcia.  Still unclear why she had mild pulmonary hypertension on her last echo but it is 2-1/2 years ago and this will be repeated even though her last proBNP was normal.  After long discussion with the patient I said based on today's labs we will probably restart Aldactazide 25/25 and stop any furosemide and then she will return in 2 months for follow-up as well as follow-up echo.  If there is evidence of diastolic dysfunction she might be a candidate for SGLT2 inhibitors but her hemoglobin A1c is usually only at risk for diabetes and her proBNP's have been normal so we will have to decide that after the echo.

## 2024-02-01 NOTE — REASON FOR VISIT
[FreeTextEntry1] : The patient is a 83-year-old woman with a history of hypertension, hypercholesterolemia, and carotid stenosis for follow up visit still with PMR,  DVT being followed by vascular DrLance Franco.  Now here after fall with fractured pelvis, edema, and found to have a right DVT and started on Xarelto.

## 2024-02-01 NOTE — REVIEW OF SYSTEMS
[Dyspnea on exertion] : dyspnea during exertion [Lower Ext Edema] : lower extremity edema [Negative] : Heme/Lymph [Weight Gain (___ Lbs)] : no recent weight gain [Weight Loss (___ Lbs)] : no recent weight loss [Chest Discomfort] : no chest discomfort [Orthopnea] : no orthopnea [PND] : no PND [Cough] : no cough [Wheezing] : no wheezing [Heartburn] : no heartburn [Change in Appetite] : no change in appetite [Constipation] : no constipation [Blood in Stool] : no blood in stool [FreeTextEntry6] : MUNROE [FreeTextEntry9] : PMR  [de-identified] : DVT

## 2024-02-01 NOTE — DISCUSSION/SUMMARY
[FreeTextEntry1] : Patient instructed firmly to not change medicines on her own and to check with her doctors if she wants to change medicines.  In the meantime once the labs are back tomorrow I will probably put her on Aldactazide 25/25 daily and have her return in 2 months for labs, follow-up, and echo.  I did recommend vascular stockings but she says she has too much trouble putting them on. [EKG obtained to assist in diagnosis and management of assessed problem(s)] : EKG obtained to assist in diagnosis and management of assessed problem(s)

## 2024-02-01 NOTE — HISTORY OF PRESENT ILLNESS
[FreeTextEntry1] : June 20, 2017. 77-year-old woman with diabetes mellitus, hyperlipidemia, hypertension who continues to complain of palpitations when she is stressed. She feels her heart being fast. It lasts for a minute or 2. The palpitations then gradually slowed down. She has no associated symptoms. The patient denies exertional dyspnea or chest pressure. She has no lightheadedness or ankle swelling. October 30, 2017. Saw Dr. Rosenstein for blurred vision suddenly (not amaurosis). Normal carotid Doppler 10/26/2017. He noted skipped beats or irregular pulse. Here now, feels fine except occasional  rapid beats. In sinus but occasional PC's on exam, with sx. Recent labs with Cholesterol 161, TG 65, HDL 74, LDL 73. HbA1c 5.8. Normal TFTs. Previous echo with minimal MR, AS?AI. Holter with only symptomatic VPCs. August 27, 2018. Here for concern over new edema left leg. Not tender,red, hot, etc. No trauma but has arthritis in that knee and recent failed steroid injection. Concerned it's her heart. Workup negative for cardiac and venous Doppler negative for DVT, but positive for Bakers cyst. It was drained by radiology. November 30, 2018. Patient here in followup. Had labs October 5, with normal CMP, fasting glucose 98, cholesterol 178, HDL 84, LDL 80. Normal TSH, normal CBC. Asked about baby aspirin, which she has been taking for years. Had negative carotid Dopplers a few years ago, with no evidence of plaque by neurology. No indication for aspirin, so she will stop it. Complained about trouble sleeping, and once tried Ambien from her internist, and didn't like it, but she will followup with the internist. December 26, 2018. Patient called earlier today. She had chest pain over Christmas day and is concerned. She's also had worsening of her edema and was told to stop her steroids. EKG here is sinus rhythm with APCs, but otherwise within normal limits.  Actually had symptoms of cough, and her internist gave her both Tamiflu and c and then subsequently gave her a Medrol pack, possibly twice She is better than initially, but still has symptoms of shortness of breath with exertion, especially when she tried to walk. Both her dogs. The other night, and concerned about the edema in her legs. Still has productive cough as well and is concerned about being able to make her flight to [place] on January 6. She also cannot hear out of her left ear because it is clogged May 2019. Patient called from [place], anxious that her blood pressure is always in the 140s to 150s over 70. Reassured her but then increased her verapamil from 180 q.12 h. to 240 q.12 h. July 29, 2019. Patient here in followup. Remains with sinus bradycardia at 58 with an otherwise normal ECG and no evidence of LVH. Outside readings of her blood pressure, mostly in the 130s occasionally even 120/60-70. She was told by the doctors in [place] of extra beats, but she never notices them. No other complaints except trouble sleeping and needing zolpidem. November 7, 2019.  Patient returns in follow-up.  EKG remains in sinus rhythm 60 and unchanged.  Good spirits feeling well blood pressure excellent EKG within normal limits.  Her internist took a CBC along with a sed rate because of her previous history of PMR but all was acceptable.  Her orthopedist wants her to have a vitamin D level and a calcium level because of her history of osteoporosis.  She is a little confused as to whether to remain on extra calcium; she takes 500 with vitamin D.   December 5, 2019.  Patient called this morning and came in very upset because of edema of her right leg.  Last week she developed the edema and her internist put her on torsemide 20 mg and potassium 20 mEq.  After 3 days she became very lightheaded and dizzy.  He told her to stop it and then when it return he told her to resume and then cut it in half.  She came in today because she is very worried.  It is obvious that the edema is not related to heart failure and it is very mild.  Torsemide is fairly strong.  She had issues with swelling in the other leg about a year ago when she has had recent injections in her right knee for arthritis and a work-up for possible recurrence of PMR despite a normal sed rate.  There is no shortness of breath PND orthopnea chest pain or change in her other status. October 26, 2020.  First visit since December of last year.  Remains in sinus rhythm at 62 with a normal ECG other than poor R wave progression.  Her PMR came back and she is having trouble getting off the prednisone; now on 5 mg daily.  This is closed her blood pressure to be elevated along with the stress from COVID-19 and she is very nervous because she has risk factors.  She asked to have both her sed rate and her blood type checked today.  Flu shot given. November 25, 2020.  Patient saw her internist yesterday to get her ears cleaned out and her blood pressure was still high.  She also has some swelling in both shins.  Staying on prednisone 5 mg at least until January when she is supposed to go down to 2.5.  Tolerating the candesartan HCT and no longer has rapid heart racing.  Is on furosemide 20 mg as well and still on verapamil.  No beta-blockers because of her asthma. Reviewed labs with patient.  She claims her blood pressure is down to 130 and 135.  She is also down to 2.5 mg on the prednisone.   May 12, 2021.  Patient here in follow-up.  Remains in sinus rhythm at 57 with a normal ECG.  Blood pressure here 134/72 on the left arm (she tends to be about 10 points higher on the right.  However she says when she checks her pressure at home she usually checks the left arm and her numbers at home are always high).  She is now on omeprazole because she gets heartburn possibly from her prednisone as she is still on 2.5 mg daily.  She claims if she walks even just a little she is out of breath but no chest pressure.  She is seeing pulmonary tomorrow and I told her I will want her to come back for an echocardiogram as well.  Whether she needs some type of stress test remains to be seen.  She is now on some Lexapro because she got very depressed over Covid.  She is fully vaccinated.  She started Tata Frank diet 2 weeks ago. June 24, 2021.  Patient returns for follow-up and echo.  Still complaining of shortness of breath with exertion.  She did the Tata Frank diet and stuck to every aspect of it except the exercise and she actually gained weight.  Had a full pulmonary work-up and was told everything seems okay but she is scheduled for a chest CT in 2 weeks and he wants to wait until those results are in.  She had her echo today which shows normal LV function, no significant valve disease, does not even seem to indicate significant diastolic dysfunction and her RVSP is 39 which is slightly higher only from 2016.  She will be short of breath walking outside weather level or incline but in the house she can go up and down the stairs and have no shortness of breath.  The diuretic helped her edema but she did not notice any change in her dyspnea on exertion.  She is reluctant to do any type of stress test even a pharmacologic nuclear 1 but perhaps a CTA would be worthwhile although I do not have a high suspicion of ischemic disease here.  As far as diagnosing diastolic dysfunction it would require a right heart cath which she is not willing to do and again my suspicion is low.  She was vaccinated but she understands that people on immunosuppressive's like she is for her PMR often do not make antibodies and is still at risk and she asked for her antibodies to be checked. December 16, 2021.  Patient here in follow-up.  Remains in sinus rhythm at 60 with her EKG unchanged.  She asked for sed rate and CRP which she then faxes to her rheumatologist and he tells her what to do with the prednisone; currently she is on 2.5 mg.  She is getting depressed with only isolation and anxiety about being immunosuppressed and the new variant.  Her blood pressure is slightly up but did come down somewhat at the end of the exam.  She knows she has to work on her weight as well. July 14, 2022.  Telehealth visit. Narrative: The patient fell about 2 weeks ago when she missed a step, fractured some ribs and has been having lower extremity swelling since then being mostly sedentary on the couch. Started to have weeping edema of her left leg and went to [place] emergency room was found to have acute DVT on the left and was placed on Xarelto 20 mg daily. She has been seeing Dr. Michaela Franco of vascular and they are scheduled for a telehealth visit tomorrow morning. Part of the work-up showed coronary calcification on CT scan. Patient is otherwise on the same medications besides the Xarelto. She is still on verapamil twice a day as well and rosuvastatin. On the right leg she does have reflux in the saphenous Brothers junction while the left leg is positive for an acute occlusive DVT in the common femoral vein extending into the TP trunk. Her blood pressure at the visit to vascular was 134/73. Pulmonary embolism was ruled out in the emergency room. Assessment: I reviewed the medications and course with the patient and I believe her sister or daughter. As mentioned she will have a telehealth visit with vascular tomorrow morning. I reinforced the importance of staying on the Xarelto and I will allow vascular to recommend whether she should be on for 3 or 6 months or perhaps lifelong. She will remain on all her other medications. If necessary I can arrange home draw for labs but hopefully she will be able to come to the office in about 2 months once her ribs are healed. Plan: Continue current medications which now include Xarelto 20 mg. Follow-up: Tomorrow morning telehealth visit with vascular. Follow-up with me in the office 2 months. November 23, 2022.  Patient is here in follow-up.  On July 15 she had a televisit with vascular.  There has been a DVT of the left lower extremity femoral vein and he placed her on Xarelto.  On July 28 she had an injection in her knee for arthritis with steroids.  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  Anticoagulation was stopped by orthopedics.  Patient was referred for physical therapy.  Here today she is in sinus rhythm at 61 with a first-degree AV block and otherwise an unremarkable ECG.  Blood pressure excellent, weight unchanged.  Her main complaint is her edema and she does have significant JVD at 90 degrees. May 23, 2023.  Patient returns in follow-up.  Initially no complaints just asked for a sed rate but then mentioned about shortness of breath with exertion; if she walks regularly she is fine but if she tries to walk fast she gets out of breath.  No chest pain tightness heaviness.  Usually her inhaler helps her.  Meanwhile she had a follow-up CT for her lung nodules in April 28 and she was concerned about prominent coronary artery calcification and I reassured her in the meantime.  Lung nodule seems to be stable.  No interval hospitalizations COVID etc.  Pretty good spirits.  Initial blood pressure high but at the end of the exam it did come back down to normal. October 11, 2023. Edema since she fractured her pelvis. Has been off all diuretics and is not on SGLT2 inhibitors. Last potassium was 4.0 and creatinine 0.49. We will do Aldactazide 25/25 every other day and if necessary every day.   November 7, 2023.  Patient returns today.  She is on Aldactazide 25/25 every other day.  She had labs on October 31 with a BUN 24, creatinine 0.48, potassium 4.2.  These will be repeated today.  Weight is down about 3 pounds.  Blood pressure is excellent.  EKG is sinus rhythm at 64 with a first-degree AV block.  She was also found to have a DVT on the right near the knee or calf and Dr. Garcia put her on Xarelto 20 mg daily and told her to see a hematologist (or they sent her to see Dr. Franco her vascular doctor who then wants her to see a hematologist as to whether she has a clotting disorder.)  Her rheumatologist also wanted me to mention whether it was safe for her to take Evenity for her osteoporosis because of its risk of stroke and myocardial infarction.  She also brought labs from September with cholesterol 140, triglycerides 98, HDL 55, LDL 67.  Hemoglobin A1c 6.2.  BUN 25, creatinine 0.56, potassium 4.1 on September 8 February 1, 2024.  Patient returns in follow-up, very frustrating visit and conversation.  She complains mostly of edema now not only on the leg with a DVT but on the left leg as well.  First she says she stopped the Aldactazide because it was not working and then she says she did not have swelling when she was taking the Aldactazide and now she does.  In addition she is on furosemide (I do not think I prescribed it but perhaps Dr. Garcia.)  But she says she only takes it occasionally and yet is still complaining about the swelling.  The hematology workup was negative and she was told to stay on Xarelto for a total of 6 months.  She does complain of some shortness of breath with exertion.  1 episode of atypical chest pain that was clearly related to anxiety.  By the end we agreed that if her labs were okay I would restart the Aldactazide, stop the furosemide, and have her come back in 2 months for follow-up and echocardiogram.  Her EKG remains sinus rhythm at 59 with first-degree AV block and poor R wave progression.

## 2024-02-02 LAB
ALBUMIN SERPL ELPH-MCNC: 4.4 G/DL
ALP BLD-CCNC: 106 U/L
ALT SERPL-CCNC: 14 U/L
ANION GAP SERPL CALC-SCNC: 12 MMOL/L
AST SERPL-CCNC: 17 U/L
BILIRUB SERPL-MCNC: 0.5 MG/DL
BUN SERPL-MCNC: 25 MG/DL
CALCIUM SERPL-MCNC: 9.5 MG/DL
CHLORIDE SERPL-SCNC: 100 MMOL/L
CHOLEST SERPL-MCNC: 149 MG/DL
CO2 SERPL-SCNC: 23 MMOL/L
CREAT SERPL-MCNC: 0.49 MG/DL
EGFR: 94 ML/MIN/1.73M2
ESTIMATED AVERAGE GLUCOSE: 117 MG/DL
GLUCOSE SERPL-MCNC: 95 MG/DL
HBA1C MFR BLD HPLC: 5.7 %
HCT VFR BLD CALC: 35.7 %
HDLC SERPL-MCNC: 63 MG/DL
HGB BLD-MCNC: 11.7 G/DL
LDLC SERPL CALC-MCNC: 69 MG/DL
MCHC RBC-ENTMCNC: 30 PG
MCHC RBC-ENTMCNC: 32.8 GM/DL
MCV RBC AUTO: 91.5 FL
NONHDLC SERPL-MCNC: 86 MG/DL
NT-PROBNP SERPL-MCNC: 160 PG/ML
PLATELET # BLD AUTO: 243 K/UL
POTASSIUM SERPL-SCNC: 4.2 MMOL/L
PROT SERPL-MCNC: 6.9 G/DL
RBC # BLD: 3.9 M/UL
RBC # FLD: 13.8 %
SODIUM SERPL-SCNC: 135 MMOL/L
TRIGL SERPL-MCNC: 92 MG/DL
WBC # FLD AUTO: 8.56 K/UL

## 2024-02-05 ENCOUNTER — APPOINTMENT (OUTPATIENT)
Dept: VASCULAR SURGERY | Facility: CLINIC | Age: 84
End: 2024-02-05
Payer: MEDICARE

## 2024-02-05 VITALS
WEIGHT: 146 LBS | DIASTOLIC BLOOD PRESSURE: 62 MMHG | TEMPERATURE: 97.8 F | SYSTOLIC BLOOD PRESSURE: 148 MMHG | OXYGEN SATURATION: 98 % | BODY MASS INDEX: 19.77 KG/M2 | HEART RATE: 61 BPM | HEIGHT: 72 IN

## 2024-02-05 DIAGNOSIS — I82.412 ACUTE EMBOLISM AND THROMBOSIS OF LEFT FEMORAL VEIN: ICD-10-CM

## 2024-02-05 PROCEDURE — 99213 OFFICE O/P EST LOW 20 MIN: CPT

## 2024-02-05 PROCEDURE — 93970 EXTREMITY STUDY: CPT

## 2024-02-05 RX ORDER — TRIAMCINOLONE ACETONIDE 1 MG/G
0.1 CREAM TOPICAL
Refills: 0 | Status: ACTIVE | COMMUNITY

## 2024-02-05 RX ORDER — TRAMADOL HYDROCHLORIDE 50 MG/1
50 TABLET, COATED ORAL
Qty: 20 | Refills: 0 | Status: DISCONTINUED | COMMUNITY
Start: 2023-09-03 | End: 2024-02-05

## 2024-02-05 RX ORDER — OMEPRAZOLE 40 MG/1
40 CAPSULE, DELAYED RELEASE ORAL
Qty: 30 | Refills: 0 | Status: DISCONTINUED | COMMUNITY
Start: 2021-05-12 | End: 2024-02-05

## 2024-02-05 RX ORDER — CLONAZEPAM 0.5 MG/1
0.5 TABLET ORAL
Qty: 90 | Refills: 0 | Status: DISCONTINUED | COMMUNITY
Start: 2017-01-31 | End: 2024-02-05

## 2024-02-05 NOTE — PHYSICAL EXAM
[Respiratory Effort] : normal respiratory effort [2+] : left 2+ [Ankle Swelling (On Exam)] : present [Ankle Swelling On The Right] : of the right ankle [Ankle Swelling On The Left] : moderate [Calm] : calm [de-identified] : Well-appearing [de-identified] : EOMI, anicteric  [de-identified] : soft, nt, nd  [de-identified] : motor and sensation intact in all four extremities  right calf swelling compared to left.  [de-identified] : no wounds on bilateral feet [de-identified] : A&Ox4

## 2024-02-05 NOTE — HISTORY OF PRESENT ILLNESS
[FreeTextEntry1] : GIL GRAF is a 82 year old female who presents for evaluation of lower extremity swelling.   Patient states that since she fell two weeks ago, when she broke her ribs, she has been having lower extremity swelling. Since her fall, she has mostly been sedentary on the couch. Denies lower extremity pain, but reports tightness. More recently, three days ago, started noticing fluid coming out of the left leg. She went to urgent care on Saturday and was referred to vascular surgery. No fever or chills.  Prior to this, had leg swelling after fall and broke her pelvis 2021. After walking, swelling improved. Now recurs. No personal history of DVT but reports mother had a PE (which seems provoked as it occurred in setting of fracture).   Of note, patient reports recent increased shortness of breath with walking. Has been using inhaler prior to walking, which helps.   + PMH: HTN, HLD, osteoporosis, asthma, PMR, depression + PSH:cataract surgery, , femur repair, carpal tunnel release, parotidectomy + FH: diabetes (mother, sister) + SH: never smoker, very limited etoh use + Aller: forteo  PCP is Dr. Miller Craig. Cardiologist is Dr. Codey Vigil.   7/15/2022-When pt presented to ER, underwent CTPA. This was negative for DVT. Patient was then discharged on xarelto 20 mg daily. Her legs are feeling better with slightly diminished swelling. She is keep them elevated when she can but is also ambulating.   2023 - Pt with pelvic fracture in 2023 managed nonoperatively. She underwent PT, now walking w/ cane and driving. She had right leg swelling and underwent a duplex on 2023. This was significant for a right femoral vein and bilateral popliteal vein DVT. She has been started on xarelto.  [de-identified] : 2/5/2024 - Patient presents for follow up. She has felt that the right leg swelling has increased. No leg pain. Continues to take xarelto 20 mg daily.  She is developing scabs on her leg from the swelling and has followed up with dermatology and was prescribed triamcinolone due to itching. The cream has been helping with the itching.

## 2024-02-05 NOTE — DATA REVIEWED
[FreeTextEntry1] : 2/5/2024 - BLE venous duplex No acute DVT.  Right - prox FV chronically contracted. Mid-distal FV and PV w/ chronic, nonocclusive DVT.  Left - FV and PV with chronic, nonocclusive DVT ---------------------------------- 11/13/2023 - Reviewed results of duplex performed 11/6/23 showing bilateral leg DVT.   7/15/2022-CT chest reviewed. no evidence of pulmonary embolism.   7/12/2022-bilateral lower extremity venous duplex Right-negative for DVT and SVT. There is reflux noted in the saphenofemoral junction only. Left-positive for acute occlusive DVT in the common femoral vein extending into the TP trunk.

## 2024-03-05 ENCOUNTER — APPOINTMENT (OUTPATIENT)
Dept: ORTHOPEDIC SURGERY | Facility: CLINIC | Age: 84
End: 2024-03-05
Payer: MEDICARE

## 2024-03-05 VITALS — HEIGHT: 72 IN | WEIGHT: 146 LBS | BODY MASS INDEX: 19.77 KG/M2

## 2024-03-05 DIAGNOSIS — S32.9XXA FRACTURE OF UNSPECIFIED PARTS OF LUMBOSACRAL SPINE AND PELVIS, INITIAL ENCOUNTER FOR CLOSED FRACTURE: ICD-10-CM

## 2024-03-05 PROCEDURE — 72170 X-RAY EXAM OF PELVIS: CPT

## 2024-03-05 PROCEDURE — 72100 X-RAY EXAM L-S SPINE 2/3 VWS: CPT

## 2024-03-05 PROCEDURE — 99213 OFFICE O/P EST LOW 20 MIN: CPT

## 2024-03-05 NOTE — IMAGING
[de-identified] : Constitutional: well developed and well nourished, able to communicate Cardiovascular: Peripheral vascular exam is grossly normal Neurologic: Alert and oriented, no acute distress. Skin: normal skin with no ulcers, rashes, or lesions Pulmonary: No respiratory distress, breathing comfortably on room air Lymphatics: No obvious lymphadenopathy or lymphedema in areas examined  LOWER EXTREMITY/ RIGHT HIP EXAM Standing pelvic alignment: Symmetric with no Trendelenburg Atrophy: none Ecchymosis/swelling: none  Range of Motion Hip: Flexion/extension/ER/IR     / EX 20/ ER 45/ IR 20 / AB 60 /AD 20 Impingement with flexion adduction and internal rotation: negative Contracture: none Snapping hip: negative Greater trochanter: no tenderness  Neurovascular Distal extremities: warm to touch Sensation to light touch: intact Muscle strength: 5/5   [id number] Xrays of the Right hip 3v were taken demonstrating healing pubic rami fracture

## 2024-03-05 NOTE — HISTORY OF PRESENT ILLNESS
[Gradual] : gradual [7] : 7 [6] : 6 [Dull/Aching] : dull/aching [Constant] : constant [de-identified] : 03/05/2024 Ms. GIL GRAF is a 83 year female that comes in today with a chief complaint of pelvis. Prior pelvis fracture that has been healing well. [] : no

## 2024-03-07 ENCOUNTER — APPOINTMENT (OUTPATIENT)
Dept: ORTHOPEDIC SURGERY | Facility: CLINIC | Age: 84
End: 2024-03-07

## 2024-03-22 ENCOUNTER — RX RENEWAL (OUTPATIENT)
Age: 84
End: 2024-03-22

## 2024-04-08 ENCOUNTER — APPOINTMENT (OUTPATIENT)
Dept: CARDIOLOGY | Facility: CLINIC | Age: 84
End: 2024-04-08
Payer: MEDICARE

## 2024-04-08 VITALS
WEIGHT: 145.19 LBS | BODY MASS INDEX: 9.44 KG/M2 | OXYGEN SATURATION: 97 % | SYSTOLIC BLOOD PRESSURE: 134 MMHG | DIASTOLIC BLOOD PRESSURE: 67 MMHG | HEART RATE: 55 BPM

## 2024-04-08 DIAGNOSIS — I82.409 ACUTE EMBOLISM AND THROMBOSIS OF UNSPECIFIED DEEP VEINS OF UNSPECIFIED LOWER EXTREMITY: ICD-10-CM

## 2024-04-08 DIAGNOSIS — R60.0 LOCALIZED EDEMA: ICD-10-CM

## 2024-04-08 DIAGNOSIS — I36.1 NONRHEUMATIC TRICUSPID (VALVE) INSUFFICIENCY: ICD-10-CM

## 2024-04-08 DIAGNOSIS — I10 ESSENTIAL (PRIMARY) HYPERTENSION: ICD-10-CM

## 2024-04-08 PROCEDURE — 99213 OFFICE O/P EST LOW 20 MIN: CPT

## 2024-04-08 PROCEDURE — 93306 TTE W/DOPPLER COMPLETE: CPT

## 2024-04-08 PROCEDURE — G2211 COMPLEX E/M VISIT ADD ON: CPT

## 2024-04-08 NOTE — DISCUSSION/SUMMARY
[FreeTextEntry1] : Check labs especially electrolytes and kidney function.  Check proBNP.  If all okay will come back in 4 months and will continue Aldactazide.  Follow-up with hematology about the DVT and his Xarelto.

## 2024-04-08 NOTE — HISTORY OF PRESENT ILLNESS
[FreeTextEntry1] : June 20, 2017. 77-year-old woman with diabetes mellitus, hyperlipidemia, hypertension who continues to complain of palpitations when she is stressed. She feels her heart being fast. It lasts for a minute or 2. The palpitations then gradually slowed down. She has no associated symptoms. The patient denies exertional dyspnea or chest pressure. She has no lightheadedness or ankle swelling. October 30, 2017. Saw Dr. Rosenstein for blurred vision suddenly (not amaurosis). Normal carotid Doppler 10/26/2017. He noted skipped beats or irregular pulse. Here now, feels fine except occasional  rapid beats. In sinus but occasional PC's on exam, with sx. Recent labs with Cholesterol 161, TG 65, HDL 74, LDL 73. HbA1c 5.8. Normal TFTs. Previous echo with minimal MR, AS?AI. Holter with only symptomatic VPCs. August 27, 2018. Here for concern over new edema left leg. Not tender,red, hot, etc. No trauma but has arthritis in that knee and recent failed steroid injection. Concerned it's her heart. Workup negative for cardiac and venous Doppler negative for DVT, but positive for Bakers cyst. It was drained by radiology. November 30, 2018. Patient here in followup. Had labs October 5, with normal CMP, fasting glucose 98, cholesterol 178, HDL 84, LDL 80. Normal TSH, normal CBC. Asked about baby aspirin, which she has been taking for years. Had negative carotid Dopplers a few years ago, with no evidence of plaque by neurology. No indication for aspirin, so she will stop it. Complained about trouble sleeping, and once tried Ambien from her internist, and didn't like it, but she will followup with the internist. December 26, 2018. Patient called earlier today. She had chest pain over Christmas day and is concerned. She's also had worsening of her edema and was told to stop her steroids. EKG here is sinus rhythm with APCs, but otherwise within normal limits.  Actually had symptoms of cough, and her internist gave her both Tamiflu and c and then subsequently gave her a Medrol pack, possibly twice She is better than initially, but still has symptoms of shortness of breath with exertion, especially when she tried to walk. Both her dogs. The other night, and concerned about the edema in her legs. Still has productive cough as well and is concerned about being able to make her flight to [place] on January 6. She also cannot hear out of her left ear because it is clogged May 2019. Patient called from [place], anxious that her blood pressure is always in the 140s to 150s over 70. Reassured her but then increased her verapamil from 180 q.12 h. to 240 q.12 h. July 29, 2019. Patient here in followup. Remains with sinus bradycardia at 58 with an otherwise normal ECG and no evidence of LVH. Outside readings of her blood pressure, mostly in the 130s occasionally even 120/60-70. She was told by the doctors in [place] of extra beats, but she never notices them. No other complaints except trouble sleeping and needing zolpidem. November 7, 2019.  Patient returns in follow-up.  EKG remains in sinus rhythm 60 and unchanged.  Good spirits feeling well blood pressure excellent EKG within normal limits.  Her internist took a CBC along with a sed rate because of her previous history of PMR but all was acceptable.  Her orthopedist wants her to have a vitamin D level and a calcium level because of her history of osteoporosis.  She is a little confused as to whether to remain on extra calcium; she takes 500 with vitamin D.   December 5, 2019.  Patient called this morning and came in very upset because of edema of her right leg.  Last week she developed the edema and her internist put her on torsemide 20 mg and potassium 20 mEq.  After 3 days she became very lightheaded and dizzy.  He told her to stop it and then when it return he told her to resume and then cut it in half.  She came in today because she is very worried.  It is obvious that the edema is not related to heart failure and it is very mild.  Torsemide is fairly strong.  She had issues with swelling in the other leg about a year ago when she has had recent injections in her right knee for arthritis and a work-up for possible recurrence of PMR despite a normal sed rate.  There is no shortness of breath PND orthopnea chest pain or change in her other status. October 26, 2020.  First visit since December of last year.  Remains in sinus rhythm at 62 with a normal ECG other than poor R wave progression.  Her PMR came back and she is having trouble getting off the prednisone; now on 5 mg daily.  This is closed her blood pressure to be elevated along with the stress from COVID-19 and she is very nervous because she has risk factors.  She asked to have both her sed rate and her blood type checked today.  Flu shot given. November 25, 2020.  Patient saw her internist yesterday to get her ears cleaned out and her blood pressure was still high.  She also has some swelling in both shins.  Staying on prednisone 5 mg at least until January when she is supposed to go down to 2.5.  Tolerating the candesartan HCT and no longer has rapid heart racing.  Is on furosemide 20 mg as well and still on verapamil.  No beta-blockers because of her asthma. Reviewed labs with patient.  She claims her blood pressure is down to 130 and 135.  She is also down to 2.5 mg on the prednisone.   May 12, 2021.  Patient here in follow-up.  Remains in sinus rhythm at 57 with a normal ECG.  Blood pressure here 134/72 on the left arm (she tends to be about 10 points higher on the right.  However she says when she checks her pressure at home she usually checks the left arm and her numbers at home are always high).  She is now on omeprazole because she gets heartburn possibly from her prednisone as she is still on 2.5 mg daily.  She claims if she walks even just a little she is out of breath but no chest pressure.  She is seeing pulmonary tomorrow and I told her I will want her to come back for an echocardiogram as well.  Whether she needs some type of stress test remains to be seen.  She is now on some Lexapro because she got very depressed over Covid.  She is fully vaccinated.  She started Tata Frank diet 2 weeks ago. June 24, 2021.  Patient returns for follow-up and echo.  Still complaining of shortness of breath with exertion.  She did the Tata Frank diet and stuck to every aspect of it except the exercise and she actually gained weight.  Had a full pulmonary work-up and was told everything seems okay but she is scheduled for a chest CT in 2 weeks and he wants to wait until those results are in.  She had her echo today which shows normal LV function, no significant valve disease, does not even seem to indicate significant diastolic dysfunction and her RVSP is 39 which is slightly higher only from 2016.  She will be short of breath walking outside weather level or incline but in the house she can go up and down the stairs and have no shortness of breath.  The diuretic helped her edema but she did not notice any change in her dyspnea on exertion.  She is reluctant to do any type of stress test even a pharmacologic nuclear 1 but perhaps a CTA would be worthwhile although I do not have a high suspicion of ischemic disease here.  As far as diagnosing diastolic dysfunction it would require a right heart cath which she is not willing to do and again my suspicion is low.  She was vaccinated but she understands that people on immunosuppressive's like she is for her PMR often do not make antibodies and is still at risk and she asked for her antibodies to be checked. December 16, 2021.  Patient here in follow-up.  Remains in sinus rhythm at 60 with her EKG unchanged.  She asked for sed rate and CRP which she then faxes to her rheumatologist and he tells her what to do with the prednisone; currently she is on 2.5 mg.  She is getting depressed with only isolation and anxiety about being immunosuppressed and the new variant.  Her blood pressure is slightly up but did come down somewhat at the end of the exam.  She knows she has to work on her weight as well. July 14, 2022.  Telehealth visit. Narrative: The patient fell about 2 weeks ago when she missed a step, fractured some ribs and has been having lower extremity swelling since then being mostly sedentary on the couch. Started to have weeping edema of her left leg and went to [facility] emergency room was found to have acute DVT on the left and was placed on Xarelto 20 mg daily. She has been seeing Dr. Michaela Franco of vascular and they are scheduled for a telehealth visit tomorrow morning. Part of the work-up showed coronary calcification on CT scan. Patient is otherwise on the same medications besides the Xarelto. She is still on verapamil twice a day as well and rosuvastatin. On the right leg she does have reflux in the saphenous Brothers junction while the left leg is positive for an acute occlusive DVT in the common femoral vein extending into the TP trunk. Her blood pressure at the visit to vascular was 134/73. Pulmonary embolism was ruled out in the emergency room. Assessment: I reviewed the medications and course with the patient and I believe her sister or daughter. As mentioned she will have a telehealth visit with vascular tomorrow morning. I reinforced the importance of staying on the Xarelto and I will allow vascular to recommend whether she should be on for 3 or 6 months or perhaps lifelong. She will remain on all her other medications. If necessary I can arrange home draw for labs but hopefully she will be able to come to the office in about 2 months once her ribs are healed. Plan: Continue current medications which now include Xarelto 20 mg. Follow-up: Tomorrow morning telehealth visit with vascular. Follow-up with me in the office 2 months. November 23, 2022.  Patient is here in follow-up.  On July 15 she had a televisit with vascular.  There has been a DVT of the left lower extremity femoral vein and he placed her on Xarelto.  On July 28 she had an injection in her knee for arthritis with steroids.  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  Anticoagulation was stopped by orthopedics.  Patient was referred for physical therapy.  Here today she is in sinus rhythm at 61 with a first-degree AV block and otherwise an unremarkable ECG.  Blood pressure excellent, weight unchanged.  Her main complaint is her edema and she does have significant JVD at 90 degrees. May 23, 2023.  Patient returns in follow-up.  Initially no complaints just asked for a sed rate but then mentioned about shortness of breath with exertion; if she walks regularly she is fine but if she tries to walk fast she gets out of breath.  No chest pain tightness heaviness.  Usually her inhaler helps her.  Meanwhile she had a follow-up CT for her lung nodules in April 28 and she was concerned about prominent coronary artery calcification and I reassured her in the meantime.  Lung nodule seems to be stable.  No interval hospitalizations COVID etc.  Pretty good spirits.  Initial blood pressure high but at the end of the exam it did come back down to normal. October 11, 2023. Edema since she fractured her pelvis. Has been off all diuretics and is not on SGLT2 inhibitors. Last potassium was 4.0 and creatinine 0.49. We will do Aldactazide 25/25 every other day and if necessary every day.   November 7, 2023.  Patient returns today.  She is on Aldactazide 25/25 every other day.  She had labs on October 31 with a BUN 24, creatinine 0.48, potassium 4.2.  These will be repeated today.  Weight is down about 3 pounds.  Blood pressure is excellent.  EKG is sinus rhythm at 64 with a first-degree AV block.  She was also found to have a DVT on the right near the knee or calf and Dr. Gracia put her on Xarelto 20 mg daily and told her to see a hematologist (or they sent her to see Dr. Franco her vascular doctor who then wants her to see a hematologist as to whether she has a clotting disorder.)  Her rheumatologist also wanted me to mention whether it was safe for her to take Evenity for her osteoporosis because of its risk of stroke and myocardial infarction.  She also brought labs from September with cholesterol 140, triglycerides 98, HDL 55, LDL 67.  Hemoglobin A1c 6.2.  BUN 25, creatinine 0.56, potassium 4.1 on September 8 February 1, 2024.  Patient returns in follow-up, very frustrating visit and conversation.  She complains mostly of edema now not only on the leg with a DVT but on the left leg as well.  First she says she stopped the Aldactazide because it was not working and then she says she did not have swelling when she was taking the Aldactazide and now she does.  In addition she is on furosemide (I do not think I prescribed it but perhaps Dr. Garcia.)  But she says she only takes it occasionally and yet is still complaining about the swelling.  The hematology workup was negative and she was told to stay on Xarelto for a total of 6 months.  She does complain of some shortness of breath with exertion.  1 episode of atypical chest pain that was clearly related to anxiety.  By the end we agreed that if her labs were okay I would restart the Aldactazide, stop the furosemide, and have her come back in 2 months for follow-up and echocardiogram.  Her EKG remains sinus rhythm at 59 with first-degree AV block and poor R wave progression.

## 2024-04-08 NOTE — REASON FOR VISIT
[FreeTextEntry1] : The patient is a 83-year-old woman with a history of hypertension, hypercholesterolemia, and carotid stenosis for follow up visit still with PMR,  DVT being followed by vascular DrLance Franco.  Now here after fall with fractured pelvis, edema, and found to have a right DVT and started on Xarelto.
[FreeTextEntry1] : The patient is a 83-year-old woman with a history of hypertension, hypercholesterolemia, and carotid stenosis for follow up visit still with PMR,  DVT being followed by vascular DrLance Franco.  Now here after fall with fractured pelvis, edema, and found to have a right DVT and started on Xarelto.
Placenta previa, low lying placenta

## 2024-04-08 NOTE — HISTORY OF PRESENT ILLNESS
[FreeTextEntry1] : June 20, 2017. 77-year-old woman with diabetes mellitus, hyperlipidemia, hypertension who continues to complain of palpitations when she is stressed. She feels her heart being fast. It lasts for a minute or 2. The palpitations then gradually slowed down. She has no associated symptoms. The patient denies exertional dyspnea or chest pressure. She has no lightheadedness or ankle swelling. October 30, 2017. Saw Dr. Rosenstein for blurred vision suddenly (not amaurosis). Normal carotid Doppler 10/26/2017. He noted skipped beats or irregular pulse. Here now, feels fine except occasional  rapid beats. In sinus but occasional PC's on exam, with sx. Recent labs with Cholesterol 161, TG 65, HDL 74, LDL 73. HbA1c 5.8. Normal TFTs. Previous echo with minimal MR, AS?AI. Holter with only symptomatic VPCs. August 27, 2018. Here for concern over new edema left leg. Not tender,red, hot, etc. No trauma but has arthritis in that knee and recent failed steroid injection. Concerned it's her heart. Workup negative for cardiac and venous Doppler negative for DVT, but positive for Bakers cyst. It was drained by radiology. November 30, 2018. Patient here in followup. Had labs October 5, with normal CMP, fasting glucose 98, cholesterol 178, HDL 84, LDL 80. Normal TSH, normal CBC. Asked about baby aspirin, which she has been taking for years. Had negative carotid Dopplers a few years ago, with no evidence of plaque by neurology. No indication for aspirin, so she will stop it. Complained about trouble sleeping, and once tried Ambien from her internist, and didn't like it, but she will followup with the internist. December 26, 2018. Patient called earlier today. She had chest pain over Christmas day and is concerned. She's also had worsening of her edema and was told to stop her steroids. EKG here is sinus rhythm with APCs, but otherwise within normal limits.  Actually had symptoms of cough, and her internist gave her both Tamiflu and c and then subsequently gave her a Medrol pack, possibly twice She is better than initially, but still has symptoms of shortness of breath with exertion, especially when she tried to walk. Both her dogs. The other night, and concerned about the edema in her legs. Still has productive cough as well and is concerned about being able to make her flight to [place] on January 6. She also cannot hear out of her left ear because it is clogged May 2019. Patient called from [place], anxious that her blood pressure is always in the 140s to 150s over 70. Reassured her but then increased her verapamil from 180 q.12 h. to 240 q.12 h. July 29, 2019. Patient here in followup. Remains with sinus bradycardia at 58 with an otherwise normal ECG and no evidence of LVH. Outside readings of her blood pressure, mostly in the 130s occasionally even 120/60-70. She was told by the doctors in [place] of extra beats, but she never notices them. No other complaints except trouble sleeping and needing zolpidem. November 7, 2019.  Patient returns in follow-up.  EKG remains in sinus rhythm 60 and unchanged.  Good spirits feeling well blood pressure excellent EKG within normal limits.  Her internist took a CBC along with a sed rate because of her previous history of PMR but all was acceptable.  Her orthopedist wants her to have a vitamin D level and a calcium level because of her history of osteoporosis.  She is a little confused as to whether to remain on extra calcium; she takes 500 with vitamin D.   December 5, 2019.  Patient called this morning and came in very upset because of edema of her right leg.  Last week she developed the edema and her internist put her on torsemide 20 mg and potassium 20 mEq.  After 3 days she became very lightheaded and dizzy.  He told her to stop it and then when it return he told her to resume and then cut it in half.  She came in today because she is very worried.  It is obvious that the edema is not related to heart failure and it is very mild.  Torsemide is fairly strong.  She had issues with swelling in the other leg about a year ago when she has had recent injections in her right knee for arthritis and a work-up for possible recurrence of PMR despite a normal sed rate.  There is no shortness of breath PND orthopnea chest pain or change in her other status. October 26, 2020.  First visit since December of last year.  Remains in sinus rhythm at 62 with a normal ECG other than poor R wave progression.  Her PMR came back and she is having trouble getting off the prednisone; now on 5 mg daily.  This is closed her blood pressure to be elevated along with the stress from COVID-19 and she is very nervous because she has risk factors.  She asked to have both her sed rate and her blood type checked today.  Flu shot given. November 25, 2020.  Patient saw her internist yesterday to get her ears cleaned out and her blood pressure was still high.  She also has some swelling in both shins.  Staying on prednisone 5 mg at least until January when she is supposed to go down to 2.5.  Tolerating the candesartan HCT and no longer has rapid heart racing.  Is on furosemide 20 mg as well and still on verapamil.  No beta-blockers because of her asthma. Reviewed labs with patient.  She claims her blood pressure is down to 130 and 135.  She is also down to 2.5 mg on the prednisone.   May 12, 2021.  Patient here in follow-up.  Remains in sinus rhythm at 57 with a normal ECG.  Blood pressure here 134/72 on the left arm (she tends to be about 10 points higher on the right.  However she says when she checks her pressure at home she usually checks the left arm and her numbers at home are always high).  She is now on omeprazole because she gets heartburn possibly from her prednisone as she is still on 2.5 mg daily.  She claims if she walks even just a little she is out of breath but no chest pressure.  She is seeing pulmonary tomorrow and I told her I will want her to come back for an echocardiogram as well.  Whether she needs some type of stress test remains to be seen.  She is now on some Lexapro because she got very depressed over Covid.  She is fully vaccinated.  She started Tata Frank diet 2 weeks ago. June 24, 2021.  Patient returns for follow-up and echo.  Still complaining of shortness of breath with exertion.  She did the Tata Frank diet and stuck to every aspect of it except the exercise and she actually gained weight.  Had a full pulmonary work-up and was told everything seems okay but she is scheduled for a chest CT in 2 weeks and he wants to wait until those results are in.  She had her echo today which shows normal LV function, no significant valve disease, does not even seem to indicate significant diastolic dysfunction and her RVSP is 39 which is slightly higher only from 2016.  She will be short of breath walking outside weather level or incline but in the house she can go up and down the stairs and have no shortness of breath.  The diuretic helped her edema but she did not notice any change in her dyspnea on exertion.  She is reluctant to do any type of stress test even a pharmacologic nuclear 1 but perhaps a CTA would be worthwhile although I do not have a high suspicion of ischemic disease here.  As far as diagnosing diastolic dysfunction it would require a right heart cath which she is not willing to do and again my suspicion is low.  She was vaccinated but she understands that people on immunosuppressive's like she is for her PMR often do not make antibodies and is still at risk and she asked for her antibodies to be checked. December 16, 2021.  Patient here in follow-up.  Remains in sinus rhythm at 60 with her EKG unchanged.  She asked for sed rate and CRP which she then faxes to her rheumatologist and he tells her what to do with the prednisone; currently she is on 2.5 mg.  She is getting depressed with only isolation and anxiety about being immunosuppressed and the new variant.  Her blood pressure is slightly up but did come down somewhat at the end of the exam.  She knows she has to work on her weight as well. July 14, 2022.  Telehealth visit. Narrative: The patient fell about 2 weeks ago when she missed a step, fractured some ribs and has been having lower extremity swelling since then being mostly sedentary on the couch. Started to have weeping edema of her left leg and went to [facility] emergency room was found to have acute DVT on the left and was placed on Xarelto 20 mg daily. She has been seeing Dr. Michaela Franco of vascular and they are scheduled for a telehealth visit tomorrow morning. Part of the work-up showed coronary calcification on CT scan. Patient is otherwise on the same medications besides the Xarelto. She is still on verapamil twice a day as well and rosuvastatin. On the right leg she does have reflux in the saphenous Brothers junction while the left leg is positive for an acute occlusive DVT in the common femoral vein extending into the TP trunk. Her blood pressure at the visit to vascular was 134/73. Pulmonary embolism was ruled out in the emergency room. Assessment: I reviewed the medications and course with the patient and I believe her sister or daughter. As mentioned she will have a telehealth visit with vascular tomorrow morning. I reinforced the importance of staying on the Xarelto and I will allow vascular to recommend whether she should be on for 3 or 6 months or perhaps lifelong. She will remain on all her other medications. If necessary I can arrange home draw for labs but hopefully she will be able to come to the office in about 2 months once her ribs are healed. Plan: Continue current medications which now include Xarelto 20 mg. Follow-up: Tomorrow morning telehealth visit with vascular. Follow-up with me in the office 2 months. November 23, 2022.  Patient is here in follow-up.  On July 15 she had a televisit with vascular.  There has been a DVT of the left lower extremity femoral vein and he placed her on Xarelto.  On July 28 she had an injection in her knee for arthritis with steroids.  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  Anticoagulation was stopped by orthopedics.  Patient was referred for physical therapy.  Here today she is in sinus rhythm at 61 with a first-degree AV block and otherwise an unremarkable ECG.  Blood pressure excellent, weight unchanged.  Her main complaint is her edema and she does have significant JVD at 90 degrees. May 23, 2023.  Patient returns in follow-up.  Initially no complaints just asked for a sed rate but then mentioned about shortness of breath with exertion; if she walks regularly she is fine but if she tries to walk fast she gets out of breath.  No chest pain tightness heaviness.  Usually her inhaler helps her.  Meanwhile she had a follow-up CT for her lung nodules in April 28 and she was concerned about prominent coronary artery calcification and I reassured her in the meantime.  Lung nodule seems to be stable.  No interval hospitalizations COVID etc.  Pretty good spirits.  Initial blood pressure high but at the end of the exam it did come back down to normal. October 11, 2023. Edema since she fractured her pelvis. Has been off all diuretics and is not on SGLT2 inhibitors. Last potassium was 4.0 and creatinine 0.49. We will do Aldactazide 25/25 every other day and if necessary every day.   November 7, 2023.  Patient returns today.  She is on Aldactazide 25/25 every other day.  She had labs on October 31 with a BUN 24, creatinine 0.48, potassium 4.2.  These will be repeated today.  Weight is down about 3 pounds.  Blood pressure is excellent.  EKG is sinus rhythm at 64 with a first-degree AV block.  She was also found to have a DVT on the right near the knee or calf and Dr. Garcia put her on Xarelto 20 mg daily and told her to see a hematologist (or they sent her to see Dr. Franco her vascular doctor who then wants her to see a hematologist as to whether she has a clotting disorder.)  Her rheumatologist also wanted me to mention whether it was safe for her to take Evenity for her osteoporosis because of its risk of stroke and myocardial infarction.  She also brought labs from September with cholesterol 140, triglycerides 98, HDL 55, LDL 67.  Hemoglobin A1c 6.2.  BUN 25, creatinine 0.56, potassium 4.1 on September 8 February 1, 2024.  Patient returns in follow-up, very frustrating visit and conversation.  She complains mostly of edema now not only on the leg with a DVT but on the left leg as well.  First she says she stopped the Aldactazide because it was not working and then she says she did not have swelling when she was taking the Aldactazide and now she does.  In addition she is on furosemide (I do not think I prescribed it but perhaps Dr. Garcia.)  But she says she only takes it occasionally and yet is still complaining about the swelling.  The hematology workup was negative and she was told to stay on Xarelto for a total of 6 months.  She does complain of some shortness of breath with exertion.  1 episode of atypical chest pain that was clearly related to anxiety.  By the end we agreed that if her labs were okay I would restart the Aldactazide, stop the furosemide, and have her come back in 2 months for follow-up and echocardiogram.  Her EKG remains sinus rhythm at 59 with first-degree AV block and poor R wave progression.

## 2024-04-08 NOTE — REVIEW OF SYSTEMS
[Dyspnea on exertion] : dyspnea during exertion [Lower Ext Edema] : no extremity edema [Negative] : Heme/Lymph [Weight Gain (___ Lbs)] : no recent weight gain [Weight Loss (___ Lbs)] : no recent weight loss [Chest Discomfort] : no chest discomfort [Orthopnea] : no orthopnea [PND] : no PND [Cough] : no cough [Wheezing] : no wheezing [Heartburn] : no heartburn [Change in Appetite] : no change in appetite [Constipation] : no constipation [Blood in Stool] : no blood in stool [FreeTextEntry6] : MUNROE [FreeTextEntry9] : PMR  [de-identified] : DVT

## 2024-04-08 NOTE — ASSESSMENT
[FreeTextEntry1] : Her edema is probably mild venous insufficiency or related to some inflammation in her knee and leg or perhaps fluid retention from her prednisone. It was under pretty good control on just furosemide 20 mg daily. 2 visits ago she had virtually no edema whatsoever. The edema was back last visit and she had been off her furosemide; concerning and surprising was significant JVD. Her last echo 6/2021 did have an RVSP of 39 despite what looked like normal LV and RV function and no diastolic dysfunction. Hard to assess her diastolic function based on the numbers. We always could try SGLT2 inhibitors and spironolactone if necessary we will just resume her furosemide. However her proBNP came back totally normal at 134. She returned May 23 and has absolutely no edema or JVD. She did bring up her shortness of breath with exertion which is mostly if she tries to walk fast; no exertional chest pressure or tightness and her breathing seems to be better with her inhaler. Her EKG and exam are okay otherwise. No symptomatic VPCs. No murmur on exam. Tried to reassure in terms of her anxiety. (Problems with osteoporosis but Fosamax in the past cause her to have fractures in her femur and she has 2 rods. Endocrinology wanted to put her on Prolia but she refused and when she did try she had an allergic reaction. Not clear that she benefits from taking exogenous calcium. The orthopedist had her go already for a calcium level and a vitamin D level. She is not on calcium but she is now on omeprazole 40 mg because of heartburn. Perhaps that is from the constant prednisone even though it is a low dose.) Patient returned November 7, 2023. She called me after she fractured her pelvis in October and was complaining about edema so I put her on Aldactazide 25/25 every other day and gave her an appointment to see me today for follow-up and blood test. In the interim she developed a DVT and is on Xarelto. Dr. Garcia sent her to vascular who wants her to have a work-up for clotting with a hematologist and she wanted me to give her the name of hematologist so I gave her 2. The medicine they want for her osteoporosis does have a risk for heart attack and stroke and I am not sure if she is an ideal candidate especially if she does turn out to have a clotting disorder. Labs were sent on the Aldactazide and were acceptable Patient returned February 1, 2024 and it is very frustrating that she stopped the spironolactone HCT on her own when she had no edema and now has edema. She may have taken the furosemide occasionally but she cannot be exact and I do not even know who gave her the furosemide; possibly Dr. Garcia. Still unclear why she had mild pulmonary hypertension on her last echo but it is 2-1/2 years ago and this will be repeated even though her last proBNP was normal. After long discussion with the patient I said based on today's labs we will probably restart Aldactazide 25/25 and stop any furosemide and then she will return in 2 months for follow-up as well as follow-up echo. If there is evidence of diastolic dysfunction she might be a candidate for SGLT2 inhibitors but her hemoglobin A1c is usually only at risk for diabetes and her proBNP's have been normal so we will have to decide that after the echo.  She returns today April 8, 2024.  She admits she is happy that she stayed with the Aldactazide this time and has virtually no edema on the right and just trace pitting on the left.  Blood pressure is good and the rest of her exam is unchanged.  Left leg enlargement remains.  Echo showed normal LV and RV function with moderate TR and mild to moderate pulmonary hypertension that is unchanged, mild to moderate MR with mild AS and virtually no difference than the echo done in 2021.  Patient would like a sed rate checked for her PMR and wants to make sure her kidneys are okay because of all the acetaminophen she takes.

## 2024-04-08 NOTE — REVIEW OF SYSTEMS
[Dyspnea on exertion] : dyspnea during exertion [Lower Ext Edema] : no extremity edema [Negative] : Heme/Lymph [Weight Gain (___ Lbs)] : no recent weight gain [Weight Loss (___ Lbs)] : no recent weight loss [Chest Discomfort] : no chest discomfort [Orthopnea] : no orthopnea [PND] : no PND [Cough] : no cough [Wheezing] : no wheezing [Heartburn] : no heartburn [Change in Appetite] : no change in appetite [Constipation] : no constipation [Blood in Stool] : no blood in stool [FreeTextEntry6] : MUNROE [FreeTextEntry9] : PMR  [de-identified] : DVT

## 2024-04-10 ENCOUNTER — NON-APPOINTMENT (OUTPATIENT)
Age: 84
End: 2024-04-10

## 2024-04-10 LAB
ALBUMIN SERPL ELPH-MCNC: 4.3 G/DL
ALP BLD-CCNC: 89 U/L
ALT SERPL-CCNC: 13 U/L
ANION GAP SERPL CALC-SCNC: 11 MMOL/L
AST SERPL-CCNC: 19 U/L
BILIRUB SERPL-MCNC: 0.4 MG/DL
BUN SERPL-MCNC: 38 MG/DL
CALCIUM SERPL-MCNC: 9.4 MG/DL
CHLORIDE SERPL-SCNC: 100 MMOL/L
CO2 SERPL-SCNC: 22 MMOL/L
CREAT SERPL-MCNC: 0.72 MG/DL
EGFR: 83 ML/MIN/1.73M2
ERYTHROCYTE [SEDIMENTATION RATE] IN BLOOD BY WESTERGREN METHOD: 54 MM/HR
GLUCOSE SERPL-MCNC: 96 MG/DL
HCT VFR BLD CALC: 35.1 %
HGB BLD-MCNC: 11.3 G/DL
MCHC RBC-ENTMCNC: 29.7 PG
MCHC RBC-ENTMCNC: 32.2 GM/DL
MCV RBC AUTO: 92.1 FL
NT-PROBNP SERPL-MCNC: 188 PG/ML
PLATELET # BLD AUTO: 223 K/UL
POTASSIUM SERPL-SCNC: 5.4 MMOL/L
PROT SERPL-MCNC: 6.9 G/DL
RBC # BLD: 3.81 M/UL
RBC # FLD: 14.8 %
SODIUM SERPL-SCNC: 133 MMOL/L
WBC # FLD AUTO: 9.79 K/UL

## 2024-04-12 RX ORDER — ROSUVASTATIN CALCIUM 5 MG/1
5 TABLET, FILM COATED ORAL
Qty: 90 | Refills: 1 | Status: ACTIVE | COMMUNITY
Start: 2018-06-19 | End: 1900-01-01

## 2024-04-12 RX ORDER — CANDESARTAN CILEXETIL AND HYDROCHLOROTHIAZIDE 32; 25 MG/1; MG/1
32-25 TABLET ORAL
Qty: 90 | Refills: 1 | Status: ACTIVE | COMMUNITY
Start: 2021-08-17 | End: 1900-01-01

## 2024-05-05 ENCOUNTER — RX RENEWAL (OUTPATIENT)
Age: 84
End: 2024-05-05

## 2024-05-05 RX ORDER — SPIRONOLACTONE AND HYDROCHLOROTHIAZIDE 25; 25 MG/1; MG/1
25-25 TABLET, FILM COATED ORAL
Qty: 90 | Refills: 0 | Status: ACTIVE | COMMUNITY
Start: 2023-10-11 | End: 1900-01-01

## 2024-05-06 ENCOUNTER — APPOINTMENT (OUTPATIENT)
Dept: VASCULAR SURGERY | Facility: CLINIC | Age: 84
End: 2024-05-06

## 2024-05-07 ENCOUNTER — APPOINTMENT (OUTPATIENT)
Dept: ULTRASOUND IMAGING | Facility: CLINIC | Age: 84
End: 2024-05-07
Payer: MEDICARE

## 2024-05-07 ENCOUNTER — OUTPATIENT (OUTPATIENT)
Dept: OUTPATIENT SERVICES | Facility: HOSPITAL | Age: 84
LOS: 1 days | End: 2024-05-07
Payer: MEDICARE

## 2024-05-07 DIAGNOSIS — I82.409 ACUTE EMBOLISM AND THROMBOSIS OF UNSPECIFIED DEEP VEINS OF UNSPECIFIED LOWER EXTREMITY: ICD-10-CM

## 2024-05-07 PROCEDURE — 93970 EXTREMITY STUDY: CPT | Mod: 26

## 2024-05-07 PROCEDURE — 93970 EXTREMITY STUDY: CPT

## 2024-05-13 ENCOUNTER — OUTPATIENT (OUTPATIENT)
Dept: OUTPATIENT SERVICES | Facility: HOSPITAL | Age: 84
LOS: 1 days | Discharge: HOME | End: 2024-05-13

## 2024-05-13 DIAGNOSIS — I82.401 ACUTE EMBOLISM AND THROMBOSIS OF UNSPECIFIED DEEP VEINS OF RIGHT LOWER EXTREMITY: ICD-10-CM

## 2024-05-13 RX ORDER — RIVAROXABAN 20 MG/1
20 TABLET, FILM COATED ORAL
Qty: 30 | Refills: 3 | Status: ACTIVE | COMMUNITY
Start: 2023-11-16 | End: 1900-01-01

## 2024-05-20 ENCOUNTER — APPOINTMENT (OUTPATIENT)
Dept: HEMATOLOGY ONCOLOGY | Facility: CLINIC | Age: 84
End: 2024-05-20
Payer: MEDICARE

## 2024-05-20 ENCOUNTER — RESULT REVIEW (OUTPATIENT)
Age: 84
End: 2024-05-20

## 2024-05-20 VITALS
WEIGHT: 146.61 LBS | TEMPERATURE: 96.9 F | RESPIRATION RATE: 16 BRPM | HEART RATE: 53 BPM | OXYGEN SATURATION: 96 % | BODY MASS INDEX: 19.86 KG/M2 | SYSTOLIC BLOOD PRESSURE: 109 MMHG | HEIGHT: 72 IN | DIASTOLIC BLOOD PRESSURE: 70 MMHG

## 2024-05-20 DIAGNOSIS — I82.411 ACUTE EMBOLISM AND THROMBOSIS OF RIGHT FEMORAL VEIN: ICD-10-CM

## 2024-05-20 LAB
BASOPHILS # BLD AUTO: 0.07 K/UL — SIGNIFICANT CHANGE UP (ref 0–0.2)
BASOPHILS NFR BLD AUTO: 0.5 % — SIGNIFICANT CHANGE UP (ref 0–2)
EOSINOPHIL # BLD AUTO: 0.26 K/UL — SIGNIFICANT CHANGE UP (ref 0–0.5)
EOSINOPHIL NFR BLD AUTO: 1.8 % — SIGNIFICANT CHANGE UP (ref 0–6)
HCT VFR BLD CALC: 35.1 % — SIGNIFICANT CHANGE UP (ref 34.5–45)
HGB BLD-MCNC: 11.5 G/DL — SIGNIFICANT CHANGE UP (ref 11.5–15.5)
IMM GRANULOCYTES NFR BLD AUTO: 0.5 % — SIGNIFICANT CHANGE UP (ref 0–0.9)
LYMPHOCYTES # BLD AUTO: 1.34 K/UL — SIGNIFICANT CHANGE UP (ref 1–3.3)
LYMPHOCYTES # BLD AUTO: 9.1 % — LOW (ref 13–44)
MCHC RBC-ENTMCNC: 30.4 PG — SIGNIFICANT CHANGE UP (ref 27–34)
MCHC RBC-ENTMCNC: 32.8 G/DL — SIGNIFICANT CHANGE UP (ref 32–36)
MCV RBC AUTO: 92.9 FL — SIGNIFICANT CHANGE UP (ref 80–100)
MONOCYTES # BLD AUTO: 1.06 K/UL — HIGH (ref 0–0.9)
MONOCYTES NFR BLD AUTO: 7.2 % — SIGNIFICANT CHANGE UP (ref 2–14)
NEUTROPHILS # BLD AUTO: 11.89 K/UL — HIGH (ref 1.8–7.4)
NEUTROPHILS NFR BLD AUTO: 80.9 % — HIGH (ref 43–77)
NRBC # BLD: 0 /100 WBCS — SIGNIFICANT CHANGE UP (ref 0–0)
NRBC BLD-RTO: 0 /100 WBCS — SIGNIFICANT CHANGE UP (ref 0–0)
PLATELET # BLD AUTO: 221 K/UL — SIGNIFICANT CHANGE UP (ref 150–400)
RBC # BLD: 3.78 M/UL — LOW (ref 3.8–5.2)
RBC # FLD: 14.5 % — SIGNIFICANT CHANGE UP (ref 10.3–14.5)
WBC # BLD: 14.69 K/UL — HIGH (ref 3.8–10.5)
WBC # FLD AUTO: 14.69 K/UL — HIGH (ref 3.8–10.5)

## 2024-05-20 PROCEDURE — 99214 OFFICE O/P EST MOD 30 MIN: CPT

## 2024-05-20 PROCEDURE — G2211 COMPLEX E/M VISIT ADD ON: CPT

## 2024-05-20 NOTE — PHYSICAL EXAM
[Normal] : affect appropriate [de-identified] : ambulates with cane [de-identified] : RLE swelling unchanged

## 2024-05-20 NOTE — HISTORY OF PRESENT ILLNESS
[de-identified] : 82yo F w/ HTN, HLD, osteoporosis, referred for recurrent DVT   Per notes, she fell in Jun/Jul 2022 and broke her ribs, had been mostly sedentary on the couch since - although pt denies having a fall then; states she had a fall in Dec 2021. She noted lower extremity swelling, saw vascular Dr. Franco and on 7/12/22 duplex showing acute occlusive DVT in left common femoral vein and throughout the femoral vein, popliteal vein and tibioperoneal trunk. She was treated with 3 mo of Xarelto. Repeat done on 10/25/22 showed chronic non occlusive DVT of the popliteal vein on the left.  Pt with pelvic fracture in 9/2023 managed nonoperatively. She had right leg swelling and underwent a duplex on 11/6/2023. This was significant for a right femoral vein and bilateral popliteal vein DVT. She has been started on Xarelto.  Her PMD checked a hypercoag panel: prot S functional 97% prot C functional 103% ATIII 110%. [de-identified] : SCT and DRVVT LA negatve KRISTINA IgG <5, IgM 14.4 B2G <5,  Duplex 5/7/24: No evidence for acute DVT in the bilateral lower extremity deep venous systems.  Her PMR flared up, started on prednisone a few weeks ago by rheum.

## 2024-05-20 NOTE — ASSESSMENT
[FreeTextEntry1] : 83yo F w/ HTN, HLD, osteoporosis, referred for recurrent DVT  Per notes, she fell in Jun/Jul 2022 and broke her ribs, had been mostly sedentary on the couch since. She had a LLE DVT that was treated with 3 months of AC Now she is s/p fall and pelvic fracture in 9/2023 managed nonoperatively. She now has RLE DVT and has been started on Xarelto.  Hypercoag panel prot C, S and ATIII wnl. APLS panel negative It seems that both DVTs are provoked by falls. Duplex after 6 mo of AC negative for DVT -can stop Xarelto Cont f/u w/ vascular and PMD She also sees rheum for osteoporosis, started Evenity.  Her PMR flared, started prednisone 5 mg, likely cause of leukocytosis seen on CBC today -results reviewed  RTC as needed All questions answered

## 2024-05-21 LAB
ALBUMIN SERPL ELPH-MCNC: 4.5 G/DL
ALP BLD-CCNC: 88 U/L
ALT SERPL-CCNC: 18 U/L
ANION GAP SERPL CALC-SCNC: 12 MMOL/L
AST SERPL-CCNC: 18 U/L
BILIRUB SERPL-MCNC: 0.6 MG/DL
BUN SERPL-MCNC: 36 MG/DL
CALCIUM SERPL-MCNC: 9.5 MG/DL
CHLORIDE SERPL-SCNC: 96 MMOL/L
CO2 SERPL-SCNC: 24 MMOL/L
CREAT SERPL-MCNC: 0.7 MG/DL
EGFR: 86 ML/MIN/1.73M2
GLUCOSE SERPL-MCNC: 114 MG/DL
POTASSIUM SERPL-SCNC: 5.2 MMOL/L
PROT SERPL-MCNC: 7 G/DL
SODIUM SERPL-SCNC: 132 MMOL/L

## 2024-05-27 NOTE — CONSULT LETTER
[Dear  ___] : Dear  [unfilled], [Consult Letter:] : I had the pleasure of evaluating your patient, [unfilled]. [FreeTextEntry1] : The patient was seen in hand consultation today. A copy of my office note is enclosed for your review with the patient's knowledge and consent.  Sincerely,  Ghassan George MD Chief, Hand Surgery Residency  ([phone]) Department of Orthopaedic Surgery  [facility] Professor of Orthopaedic Surgery Wesley JOHNSON at [facility]

## 2024-05-27 NOTE — HISTORY OF PRESENT ILLNESS
[FreeTextEntry1] : The patient is 83 yo HD patient, with notable past history of pelvis fracture, DVT, knee arthritis. who presents as a NEW HAND PATIENT. The patient

## 2024-05-30 ENCOUNTER — APPOINTMENT (OUTPATIENT)
Dept: ORTHOPEDIC SURGERY | Facility: CLINIC | Age: 84
End: 2024-05-30

## 2024-06-06 ENCOUNTER — APPOINTMENT (OUTPATIENT)
Dept: ORTHOPEDIC SURGERY | Facility: CLINIC | Age: 84
End: 2024-06-06
Payer: MEDICARE

## 2024-06-06 VITALS — BODY MASS INDEX: 30.64 KG/M2 | HEIGHT: 58 IN | WEIGHT: 146 LBS

## 2024-06-06 DIAGNOSIS — M17.11 UNILATERAL PRIMARY OSTEOARTHRITIS, RIGHT KNEE: ICD-10-CM

## 2024-06-06 DIAGNOSIS — M17.12 UNILATERAL PRIMARY OSTEOARTHRITIS, LEFT KNEE: ICD-10-CM

## 2024-06-06 PROCEDURE — J3490M: CUSTOM | Mod: NC

## 2024-06-06 PROCEDURE — 20610 DRAIN/INJ JOINT/BURSA W/O US: CPT | Mod: RT

## 2024-06-06 PROCEDURE — 99214 OFFICE O/P EST MOD 30 MIN: CPT | Mod: 25

## 2024-06-06 NOTE — HISTORY OF PRESENT ILLNESS
[8] : 8 [5] : 5 [Dull/Aching] : dull/aching [de-identified] : 6.6.24 PATIENT HERE FOR ELVA KNEE PAIN. REQUESTING CORTISONE

## 2024-06-06 NOTE — IMAGING
[de-identified] : Constitutional: The patient appears well developed, well nourished. Examination of patients ability to communicate functionally was normal. \par  \par  Skin: Skin of the head and face is normal without rashes, lesions or ulcers. \par  \par  Cardiovascular: Peripheral Vascular System is normal. \par  \par  Neurologic: Alert and oriented to time, place and person. No evidence of mood disorder, calm affect. \par  \par  Right Knee: Inspection of the knee is as follows: no effusion, erythema, ecchymosis, scars or deformities. Palpation of the knee is as follows: lateral facet of patella tenderness, patellar compression tenderness and crepitus about the patella. Knee Range of Motion is as follows: Extension: 0 degrees. Flexion: 140 degrees. Strength examination of the knee is as follows: Quadriceps strength is 4/5 Hamstring strength is 4/5 Ligament Stability and Special Test ligamentously stable. \par  \par  Right Hip/Thigh: Inspection of the hip/thigh is as follows: Inspection shows no swelling and no ecchymosis. Range of motion of the hip is as follows: full ROM \par  \par  Left Knee: Inspection of the knee is as follows: no effusion, erythema, ecchymosis, scars or deformities. Palpation of the knee is as follows: lateral facet of patella tenderness, patellar compression tenderness and crepitus about the patella. Knee Range of Motion is as follows: Extension: 0 degrees. Flexion: 140 degrees. Strength examination of the knee is as follows: Quadriceps strength is 4/5 Hamstring strength is 4/5 Ligament Stability and Special Test ligamentously stable. \par  \par  Left Hip/Thigh: Inspection of the hip/thigh is as follows: Inspection shows no swelling and no ecchymosis. Range of motion of the hip is as follows: full ROM.

## 2024-06-06 NOTE — PROCEDURE
[Right] : of the right [Knee] : knee [Pain] : pain [Alcohol] : alcohol [Betadine] : betadine [Ethyl Chloride sprayed topically] : ethyl chloride sprayed topically [] : Patient tolerated procedure well [de-identified] : Procedure Name: Large Joint Injection / Aspiration: Depomedrol and Marcaine Anesthesia: ethyl chloride sprayed topically..  Depomedrol: An injection of Depomedrol 80 mg , 1 cc.  Marcaine: 5 cc.  Medication was injected in the right knee. Patient has tried OTC's including aspirin, Ibuprofen, Aleve etc or prescription NSAIDS, and/or exercises at home and/ or physical therapy without satisfactory response. After verbal consent using sterile preparation and technique. The risks, benefits, and alternatives to cortisone injection were explained in full to the patient. Risks outlined include but are not limited to infection, sepsis, bleeding, scarring, skin discoloration, temporary  increase in pain, syncopal episode, failure to resolve symptoms, allergic reaction, symptom recurrence, and elevation of blood sugar in diabetics. Patient understood the risks. All questions were answered. After discussion of options, patient requested an injection. Oral informed consent was obtained and sterile prep was done of the injection site. Sterile technique was utilized for the procedure including the preparation of the solutions used for the injection. Patient tolerated the procedure well. Advised to ice the injection site this evening. Prep with alcohol locally to site. Sterile technique used. Post Procedure Instructions: Patient was advised to call if redness, pain, or fever occur and apply ice for 15 min. out of every hour for the next 12-24 hours as tolerated.  [de-identified] : 5cc

## 2024-06-06 NOTE — ASSESSMENT
[FreeTextEntry1] : 84 year F WITH MODERATE B/L KNEE OA, R>L. PAIN WORSENS WITH STAIRS AND WALKING PROLONGED DISTANCES. PAIN IS AFFECTING ADL AND FUNCTIONAL ACTIVITIES. TREATMENT OPTIONS REVIEWED. QUESTIONS ANSWERED.  RT KNEE CSI TODAY. PATIENT TOLERATED INJECTION WELL.

## 2024-06-25 ENCOUNTER — APPOINTMENT (OUTPATIENT)
Dept: ORTHOPEDIC SURGERY | Facility: CLINIC | Age: 84
End: 2024-06-25
Payer: MEDICARE

## 2024-06-25 VITALS — HEIGHT: 58 IN | BODY MASS INDEX: 30.64 KG/M2 | WEIGHT: 146 LBS

## 2024-06-25 DIAGNOSIS — M54.50 LOW BACK PAIN, UNSPECIFIED: ICD-10-CM

## 2024-06-25 PROCEDURE — 99213 OFFICE O/P EST LOW 20 MIN: CPT

## 2024-07-09 ENCOUNTER — APPOINTMENT (OUTPATIENT)
Dept: ORTHOPEDIC SURGERY | Facility: CLINIC | Age: 84
End: 2024-07-09
Payer: MEDICARE

## 2024-07-09 VITALS — WEIGHT: 146 LBS | HEIGHT: 58 IN | BODY MASS INDEX: 30.64 KG/M2

## 2024-07-09 DIAGNOSIS — M47.812 SPONDYLOSIS W/OUT MYELOPATHY OR RADICULOPATHY, CERVICAL REGION: ICD-10-CM

## 2024-07-09 PROCEDURE — 72050 X-RAY EXAM NECK SPINE 4/5VWS: CPT

## 2024-07-09 PROCEDURE — 99214 OFFICE O/P EST MOD 30 MIN: CPT

## 2024-07-09 RX ORDER — MELOXICAM 7.5 MG/1
7.5 TABLET ORAL DAILY
Qty: 60 | Refills: 0 | Status: ACTIVE | COMMUNITY
Start: 2024-07-09 | End: 1900-01-01

## 2024-07-09 RX ORDER — METHOCARBAMOL 500 MG/1
500 TABLET, FILM COATED ORAL
Qty: 30 | Refills: 0 | Status: ACTIVE | COMMUNITY
Start: 2024-07-09 | End: 1900-01-01

## 2024-07-18 ENCOUNTER — NON-APPOINTMENT (OUTPATIENT)
Age: 84
End: 2024-07-18

## 2024-07-18 ENCOUNTER — APPOINTMENT (OUTPATIENT)
Dept: CARDIOLOGY | Facility: CLINIC | Age: 84
End: 2024-07-18
Payer: MEDICARE

## 2024-07-18 VITALS
HEIGHT: 58 IN | HEART RATE: 58 BPM | BODY MASS INDEX: 31.07 KG/M2 | DIASTOLIC BLOOD PRESSURE: 72 MMHG | RESPIRATION RATE: 17 BRPM | OXYGEN SATURATION: 98 % | WEIGHT: 148 LBS | SYSTOLIC BLOOD PRESSURE: 149 MMHG

## 2024-07-18 DIAGNOSIS — R60.0 LOCALIZED EDEMA: ICD-10-CM

## 2024-07-18 DIAGNOSIS — I10 ESSENTIAL (PRIMARY) HYPERTENSION: ICD-10-CM

## 2024-07-18 DIAGNOSIS — R06.00 DYSPNEA, UNSPECIFIED: ICD-10-CM

## 2024-07-18 DIAGNOSIS — R73.03 PREDIABETES.: ICD-10-CM

## 2024-07-18 PROCEDURE — 99214 OFFICE O/P EST MOD 30 MIN: CPT

## 2024-07-18 PROCEDURE — 93000 ELECTROCARDIOGRAM COMPLETE: CPT

## 2024-07-18 PROCEDURE — G2211 COMPLEX E/M VISIT ADD ON: CPT

## 2024-07-26 ENCOUNTER — APPOINTMENT (OUTPATIENT)
Dept: MRI IMAGING | Facility: CLINIC | Age: 84
End: 2024-07-26
Payer: MEDICARE

## 2024-07-26 PROCEDURE — 72141 MRI NECK SPINE W/O DYE: CPT | Mod: MH

## 2024-08-05 ENCOUNTER — NON-APPOINTMENT (OUTPATIENT)
Age: 84
End: 2024-08-05

## 2024-08-06 ENCOUNTER — APPOINTMENT (OUTPATIENT)
Dept: ORTHOPEDIC SURGERY | Facility: CLINIC | Age: 84
End: 2024-08-06

## 2024-08-06 PROBLEM — M54.12 CERVICAL RADICULOPATHY: Status: ACTIVE | Noted: 2024-08-06

## 2024-08-06 PROBLEM — G95.9 CERVICAL MYELOPATHY: Status: ACTIVE | Noted: 2024-08-06

## 2024-08-06 PROCEDURE — 99215 OFFICE O/P EST HI 40 MIN: CPT

## 2024-08-06 NOTE — ASSESSMENT
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 We have discussed the step-wise nature of neurologic decline. We have discussed the rationale for surgery for myelopathy, to maintain neurologic function, and that gain of function is not a guarantee. We have discussed monitoring for progression of disease.  Muscle Relaxants- To help decrease muscle spasm and assist with pain relief. Advised of sedating effects and instructed not to drive, operate heavy machinery, or take with other sedating medications.  NSAIDs- Patient warned of risk of medication to GI tract, increased blood pressure, cardiac risk, and risk of fluid retention.  Advised to clear medication with internist or PCP if any concurrent health problem with heart, blood pressure, or GI system exists.

## 2024-08-06 NOTE — IMAGING
[de-identified] : CSPINE Inspection: No rash or ecchymosis Palpation: No spasm in traps, rhomboids, paracervicals ROM: limited on all planes Strength: 5/5 bilateral deltoid, biceps, triceps, wrist flexors, wrist extensors, , abductors Sensation: Sensation present to light touch bilateral C5-T1 distributions Reflexes: Negative Sanchez's bilaterally Unable to tandem gait DTR all diminished

## 2024-08-06 NOTE — HISTORY OF PRESENT ILLNESS
[Neck] : neck [] : yes [de-identified] : 7/26/2024 Cervical MRI  - report noted in chart.   Ind. 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 Initially pain was on both sides of the c-spine, since has been R > L. +cane for balance. + fine motor disfunction, no bowel bladder disfunction  hx of pelvic fx, initially why Pt was using cane, has been using to help with ambulation/balance 8/6/24- Cervical MRI f/u. [FreeTextEntry5] : Patient has on and off neck pain/stiffness for the past few years. Patient states over the past few weeks pain has worsened, pain travels into the back of the head and down the back. Right hand dominant

## 2024-08-12 ENCOUNTER — APPOINTMENT (OUTPATIENT)
Dept: VASCULAR SURGERY | Facility: CLINIC | Age: 84
End: 2024-08-12

## 2024-08-12 RX ORDER — EMPAGLIFLOZIN 10 MG/1
10 TABLET, FILM COATED ORAL
Qty: 90 | Refills: 0 | Status: ACTIVE | COMMUNITY
Start: 2024-08-12 | End: 1900-01-01

## 2024-09-19 ENCOUNTER — APPOINTMENT (OUTPATIENT)
Dept: ORTHOPEDIC SURGERY | Facility: CLINIC | Age: 84
End: 2024-09-19

## 2024-09-19 DIAGNOSIS — M17.12 UNILATERAL PRIMARY OSTEOARTHRITIS, LEFT KNEE: ICD-10-CM

## 2024-09-19 DIAGNOSIS — M17.11 UNILATERAL PRIMARY OSTEOARTHRITIS, RIGHT KNEE: ICD-10-CM

## 2024-09-19 PROCEDURE — 20610 DRAIN/INJ JOINT/BURSA W/O US: CPT | Mod: 50

## 2024-09-19 PROCEDURE — J3490M: CUSTOM | Mod: NC

## 2024-09-19 PROCEDURE — 99214 OFFICE O/P EST MOD 30 MIN: CPT | Mod: 25

## 2024-09-19 NOTE — PROCEDURE
[de-identified] : Procedure Name: Large Joint Injection / Aspiration: Depomedrol and Marcaine Anesthesia: ethyl chloride sprayed topically..  Depomedrol: An injection of Depomedrol 80 mg , 1 cc.  Marcaine: 5 cc.  Medication was injected in bilateral knees. Patient has tried OTC's including aspirin, Ibuprofen, Aleve etc or prescriptionNSAIDS, and/or exercises at home and/ or physical therapy without satisfactory response. After verbal consent using sterile preparation and technique. The risks, benefits, and alternatives to cortisone injection were explained in full to the patient. Risks outlined include but are not limited to infection, sepsis, bleeding, scarring, skin discoloration, temporary  increase in pain, syncopal episode, failure to resolve symptoms, allergic reaction, symptom recurrence, and elevation of blood sugar in diabetics. Patient understood the risks. All questions were answered. After discussion of options, patient requested an injection. Oral informed consent was obtained and sterile prep was done of the injection site. Sterile technique was utilized for the procedure including the preparation of the solutions used for the injection. Patient tolerated the procedure well. Advised to ice the injection site this evening. Prep with alcohol locally to site. Sterile technique used. Post Procedure Instructions: Patient was advised to call if redness, pain, or fever occur and apply ice for 15 min. out of every hour for the next 12-24 hours as tolerated.

## 2024-09-19 NOTE — IMAGING
[de-identified] : Constitutional: The patient appears well developed, well nourished. Examination of patients ability to communicate functionally was normal. \par  \par  Skin: Skin of the head and face is normal without rashes, lesions or ulcers. \par  \par  Cardiovascular: Peripheral Vascular System is normal. \par  \par  Neurologic: Alert and oriented to time, place and person. No evidence of mood disorder, calm affect. \par  \par  Right Knee: Inspection of the knee is as follows: no effusion, erythema, ecchymosis, scars or deformities. Palpation of the knee is as follows: lateral facet of patella tenderness, patellar compression tenderness and crepitus about the patella. Knee Range of Motion is as follows: Extension: 0 degrees. Flexion: 140 degrees. Strength examination of the knee is as follows: Quadriceps strength is 4/5 Hamstring strength is 4/5 Ligament Stability and Special Test ligamentously stable. \par  \par  Right Hip/Thigh: Inspection of the hip/thigh is as follows: Inspection shows no swelling and no ecchymosis. Range of motion of the hip is as follows: full ROM \par  \par  Left Knee: Inspection of the knee is as follows: no effusion, erythema, ecchymosis, scars or deformities. Palpation of the knee is as follows: lateral facet of patella tenderness, patellar compression tenderness and crepitus about the patella. Knee Range of Motion is as follows: Extension: 0 degrees. Flexion: 140 degrees. Strength examination of the knee is as follows: Quadriceps strength is 4/5 Hamstring strength is 4/5 Ligament Stability and Special Test ligamentously stable. \par  \par  Left Hip/Thigh: Inspection of the hip/thigh is as follows: Inspection shows no swelling and no ecchymosis. Range of motion of the hip is as follows: full ROM.

## 2024-09-19 NOTE — ASSESSMENT
[FreeTextEntry1] : 84 year F WITH MODERATE B/L KNEE OA, R>L. PAIN WORSENS WITH STAIRS AND WALKING PROLONGED DISTANCES. PAIN IS AFFECTING ADL AND FUNCTIONAL ACTIVITIES. TREATMENT OPTIONS REVIEWED. QUESTIONS ANSWERED.  B/L KNEE CSI TODAY. PATIENT TOLERATED INJECTION WELL.   MEDICATION USE DISCUSSED. BECAUSE THE PATIENT IS ON A BLOOD THINNER, I RECOMMEND NOT TAKING NSAIDS AND CONTINUING WITH TYLENOL AS NEEDED FOR PAIN.

## 2024-09-19 NOTE — HISTORY OF PRESENT ILLNESS
[8] : 8 [5] : 5 [Dull/Aching] : dull/aching [de-identified] : 6.6.24 PATIENT HERE FOR ELVA KNEE PAIN. REQUESTING CORTISONE  9.18.24 PATIENT HERE FOR BILATERAL KNEE PAIN. REQUESTING CORTISONE INJECTIONS

## 2024-09-26 ENCOUNTER — APPOINTMENT (OUTPATIENT)
Dept: ORTHOPEDIC SURGERY | Facility: CLINIC | Age: 84
End: 2024-09-26

## 2024-10-03 ENCOUNTER — APPOINTMENT (OUTPATIENT)
Dept: ORTHOPEDIC SURGERY | Facility: CLINIC | Age: 84
End: 2024-10-03

## 2024-10-08 ENCOUNTER — APPOINTMENT (OUTPATIENT)
Dept: CARDIOLOGY | Facility: CLINIC | Age: 84
End: 2024-10-08
Payer: MEDICARE

## 2024-10-08 ENCOUNTER — NON-APPOINTMENT (OUTPATIENT)
Age: 84
End: 2024-10-08

## 2024-10-08 VITALS
SYSTOLIC BLOOD PRESSURE: 130 MMHG | BODY MASS INDEX: 30.1 KG/M2 | DIASTOLIC BLOOD PRESSURE: 60 MMHG | OXYGEN SATURATION: 97 % | HEART RATE: 54 BPM | WEIGHT: 144 LBS

## 2024-10-08 DIAGNOSIS — R06.00 DYSPNEA, UNSPECIFIED: ICD-10-CM

## 2024-10-08 DIAGNOSIS — M35.3 POLYMYALGIA RHEUMATICA: ICD-10-CM

## 2024-10-08 DIAGNOSIS — R73.03 PREDIABETES.: ICD-10-CM

## 2024-10-08 DIAGNOSIS — I10 ESSENTIAL (PRIMARY) HYPERTENSION: ICD-10-CM

## 2024-10-08 PROCEDURE — G2211 COMPLEX E/M VISIT ADD ON: CPT

## 2024-10-08 PROCEDURE — 99214 OFFICE O/P EST MOD 30 MIN: CPT

## 2024-10-08 PROCEDURE — 93000 ELECTROCARDIOGRAM COMPLETE: CPT

## 2024-10-09 LAB
ALBUMIN SERPL ELPH-MCNC: 4.3 G/DL
ALP BLD-CCNC: 86 U/L
ALT SERPL-CCNC: 18 U/L
ANION GAP SERPL CALC-SCNC: 13 MMOL/L
AST SERPL-CCNC: 19 U/L
BASOPHILS # BLD AUTO: 0.08 K/UL
BASOPHILS NFR BLD AUTO: 1 %
BILIRUB SERPL-MCNC: 0.5 MG/DL
BUN SERPL-MCNC: 20 MG/DL
CALCIUM SERPL-MCNC: 9.4 MG/DL
CHLORIDE SERPL-SCNC: 96 MMOL/L
CHOLEST SERPL-MCNC: 151 MG/DL
CO2 SERPL-SCNC: 25 MMOL/L
CREAT SERPL-MCNC: 0.6 MG/DL
EGFR: 89 ML/MIN/1.73M2
EOSINOPHIL # BLD AUTO: 0.27 K/UL
EOSINOPHIL NFR BLD AUTO: 3.2 %
ESTIMATED AVERAGE GLUCOSE: 126 MG/DL
GLUCOSE SERPL-MCNC: 89 MG/DL
HBA1C MFR BLD HPLC: 6 %
HCT VFR BLD CALC: 39.3 %
HDLC SERPL-MCNC: 72 MG/DL
HGB BLD-MCNC: 12.8 G/DL
IMM GRANULOCYTES NFR BLD AUTO: 0.2 %
LDLC SERPL CALC-MCNC: 64 MG/DL
LYMPHOCYTES # BLD AUTO: 1.46 K/UL
LYMPHOCYTES NFR BLD AUTO: 17.5 %
MAN DIFF?: NORMAL
MCHC RBC-ENTMCNC: 30 PG
MCHC RBC-ENTMCNC: 32.6 GM/DL
MCV RBC AUTO: 92 FL
MONOCYTES # BLD AUTO: 0.87 K/UL
MONOCYTES NFR BLD AUTO: 10.4 %
NEUTROPHILS # BLD AUTO: 5.64 K/UL
NEUTROPHILS NFR BLD AUTO: 67.7 %
NONHDLC SERPL-MCNC: 79 MG/DL
NT-PROBNP SERPL-MCNC: 187 PG/ML
PLATELET # BLD AUTO: 168 K/UL
POTASSIUM SERPL-SCNC: 4.1 MMOL/L
PROT SERPL-MCNC: 6.9 G/DL
RBC # BLD: 4.27 M/UL
RBC # FLD: 14.1 %
SODIUM SERPL-SCNC: 134 MMOL/L
TRIGL SERPL-MCNC: 79 MG/DL
TSH SERPL-ACNC: 1.75 UIU/ML
WBC # FLD AUTO: 8.34 K/UL

## 2024-10-10 LAB — ERYTHROCYTE [SEDIMENTATION RATE] IN BLOOD BY WESTERGREN METHOD: 16 MM/HR

## 2024-11-07 ENCOUNTER — RX RENEWAL (OUTPATIENT)
Age: 84
End: 2024-11-07

## 2024-11-13 ENCOUNTER — APPOINTMENT (OUTPATIENT)
Dept: CARDIOLOGY | Facility: CLINIC | Age: 84
End: 2024-11-13

## 2024-11-21 ENCOUNTER — APPOINTMENT (OUTPATIENT)
Dept: ORTHOPEDIC SURGERY | Facility: CLINIC | Age: 84
End: 2024-11-21
Payer: MEDICARE

## 2024-11-21 VITALS — BODY MASS INDEX: 30.64 KG/M2 | HEIGHT: 58 IN | WEIGHT: 146 LBS

## 2024-11-21 DIAGNOSIS — M19.041 PRIMARY OSTEOARTHRITIS, RIGHT HAND: ICD-10-CM

## 2024-11-21 DIAGNOSIS — M19.042 PRIMARY OSTEOARTHRITIS, LEFT HAND: ICD-10-CM

## 2024-11-21 DIAGNOSIS — M11.241 OTHER CHONDROCALCINOSIS, RIGHT HAND: ICD-10-CM

## 2024-11-21 DIAGNOSIS — M11.231 OTHER CHONDROCALCINOSIS, RIGHT WRIST: ICD-10-CM

## 2024-11-21 DIAGNOSIS — M79.89 OTHER SPECIFIED SOFT TISSUE DISORDERS: ICD-10-CM

## 2024-11-21 PROCEDURE — 20600 DRAIN/INJ JOINT/BURSA W/O US: CPT | Mod: RT

## 2024-11-21 PROCEDURE — 99203 OFFICE O/P NEW LOW 30 MIN: CPT | Mod: 25

## 2024-11-21 PROCEDURE — 73110 X-RAY EXAM OF WRIST: CPT | Mod: RT

## 2024-12-13 ENCOUNTER — APPOINTMENT (OUTPATIENT)
Dept: ORTHOPEDIC SURGERY | Facility: CLINIC | Age: 84
End: 2024-12-13
Payer: MEDICARE

## 2024-12-13 DIAGNOSIS — M43.17 SPONDYLOLISTHESIS, LUMBOSACRAL REGION: ICD-10-CM

## 2024-12-13 DIAGNOSIS — M46.1 SACROILIITIS, NOT ELSEWHERE CLASSIFIED: ICD-10-CM

## 2024-12-13 DIAGNOSIS — Z00.00 ENCOUNTER FOR GENERAL ADULT MEDICAL EXAMINATION W/OUT ABNORMAL FINDINGS: ICD-10-CM

## 2024-12-13 PROCEDURE — 72170 X-RAY EXAM OF PELVIS: CPT

## 2024-12-13 PROCEDURE — 72110 X-RAY EXAM L-2 SPINE 4/>VWS: CPT

## 2024-12-13 PROCEDURE — 99214 OFFICE O/P EST MOD 30 MIN: CPT

## 2024-12-13 RX ORDER — DICLOFENAC SODIUM 10 MG/G
1 GEL TOPICAL DAILY
Qty: 1 | Refills: 1 | Status: ACTIVE | COMMUNITY
Start: 2024-12-13 | End: 1900-01-01

## 2024-12-13 RX ORDER — LIDOCAINE 40 MG/G
4 PATCH TOPICAL
Qty: 30 | Refills: 2 | Status: ACTIVE | COMMUNITY
Start: 2024-12-13 | End: 1900-01-01

## 2024-12-19 ENCOUNTER — APPOINTMENT (OUTPATIENT)
Dept: ORTHOPEDIC SURGERY | Facility: CLINIC | Age: 84
End: 2024-12-19

## 2025-03-22 ENCOUNTER — RESULT CHARGE (OUTPATIENT)
Age: 85
End: 2025-03-22

## 2025-03-22 ENCOUNTER — APPOINTMENT (OUTPATIENT)
Dept: ORTHOPEDIC SURGERY | Facility: CLINIC | Age: 85
End: 2025-03-22
Payer: MEDICARE

## 2025-03-22 VITALS — HEIGHT: 58 IN | BODY MASS INDEX: 28.76 KG/M2 | WEIGHT: 137 LBS

## 2025-03-22 DIAGNOSIS — M76.61 ACHILLES TENDINITIS, RIGHT LEG: ICD-10-CM

## 2025-03-22 PROCEDURE — 99203 OFFICE O/P NEW LOW 30 MIN: CPT

## 2025-03-22 PROCEDURE — 73630 X-RAY EXAM OF FOOT: CPT | Mod: RT

## 2025-03-22 PROCEDURE — 99213 OFFICE O/P EST LOW 20 MIN: CPT

## 2025-03-22 RX ORDER — METHYLPREDNISOLONE 4 MG/1
4 TABLET ORAL
Qty: 1 | Refills: 0 | Status: ACTIVE | COMMUNITY
Start: 2025-03-22 | End: 1900-01-01

## 2025-04-07 ENCOUNTER — APPOINTMENT (OUTPATIENT)
Dept: ORTHOPEDIC SURGERY | Facility: CLINIC | Age: 85
End: 2025-04-07

## 2025-04-11 ENCOUNTER — NON-APPOINTMENT (OUTPATIENT)
Age: 85
End: 2025-04-11

## 2025-04-11 ENCOUNTER — APPOINTMENT (OUTPATIENT)
Dept: CARDIOLOGY | Facility: CLINIC | Age: 85
End: 2025-04-11
Payer: MEDICARE

## 2025-04-11 VITALS
HEART RATE: 49 BPM | DIASTOLIC BLOOD PRESSURE: 60 MMHG | WEIGHT: 145 LBS | SYSTOLIC BLOOD PRESSURE: 122 MMHG | OXYGEN SATURATION: 96 % | BODY MASS INDEX: 30.31 KG/M2

## 2025-04-11 DIAGNOSIS — R00.2 PALPITATIONS: ICD-10-CM

## 2025-04-11 DIAGNOSIS — R00.1 BRADYCARDIA, UNSPECIFIED: ICD-10-CM

## 2025-04-11 DIAGNOSIS — I10 ESSENTIAL (PRIMARY) HYPERTENSION: ICD-10-CM

## 2025-04-11 DIAGNOSIS — R73.03 PREDIABETES.: ICD-10-CM

## 2025-04-11 DIAGNOSIS — R06.00 DYSPNEA, UNSPECIFIED: ICD-10-CM

## 2025-04-11 PROCEDURE — G2211 COMPLEX E/M VISIT ADD ON: CPT

## 2025-04-11 PROCEDURE — 93000 ELECTROCARDIOGRAM COMPLETE: CPT

## 2025-04-11 PROCEDURE — 99214 OFFICE O/P EST MOD 30 MIN: CPT

## 2025-04-15 LAB
ALBUMIN SERPL ELPH-MCNC: 4 G/DL
ALP BLD-CCNC: 60 U/L
ALT SERPL-CCNC: 25 U/L
ANION GAP SERPL CALC-SCNC: 13 MMOL/L
AST SERPL-CCNC: 19 U/L
BASOPHILS # BLD AUTO: 0.06 K/UL
BASOPHILS NFR BLD AUTO: 0.8 %
BILIRUB SERPL-MCNC: 0.6 MG/DL
BUN SERPL-MCNC: 26 MG/DL
CALCIUM SERPL-MCNC: 9.3 MG/DL
CHLORIDE SERPL-SCNC: 99 MMOL/L
CHOLEST SERPL-MCNC: 139 MG/DL
CO2 SERPL-SCNC: 24 MMOL/L
CREAT SERPL-MCNC: 0.72 MG/DL
EGFRCR SERPLBLD CKD-EPI 2021: 83 ML/MIN/1.73M2
EOSINOPHIL # BLD AUTO: 0.17 K/UL
EOSINOPHIL NFR BLD AUTO: 2.2 %
ESTIMATED AVERAGE GLUCOSE: 126 MG/DL
GLUCOSE SERPL-MCNC: 85 MG/DL
HBA1C MFR BLD HPLC: 6 %
HCT VFR BLD CALC: 38.8 %
HDLC SERPL-MCNC: 73 MG/DL
HGB BLD-MCNC: 12.5 G/DL
IMM GRANULOCYTES NFR BLD AUTO: 0.5 %
LDLC SERPL-MCNC: 53 MG/DL
LYMPHOCYTES # BLD AUTO: 1.27 K/UL
LYMPHOCYTES NFR BLD AUTO: 16.4 %
MAN DIFF?: NORMAL
MCHC RBC-ENTMCNC: 29.6 PG
MCHC RBC-ENTMCNC: 32.2 G/DL
MCV RBC AUTO: 91.7 FL
MONOCYTES # BLD AUTO: 0.74 K/UL
MONOCYTES NFR BLD AUTO: 9.6 %
NEUTROPHILS # BLD AUTO: 5.46 K/UL
NEUTROPHILS NFR BLD AUTO: 70.5 %
NONHDLC SERPL-MCNC: 67 MG/DL
NT-PROBNP SERPL-MCNC: 245 PG/ML
PLATELET # BLD AUTO: 167 K/UL
POTASSIUM SERPL-SCNC: 4.4 MMOL/L
PROT SERPL-MCNC: 6.2 G/DL
RBC # BLD: 4.23 M/UL
RBC # FLD: 15.1 %
SODIUM SERPL-SCNC: 136 MMOL/L
TRIGL SERPL-MCNC: 66 MG/DL
TSH SERPL-ACNC: 2.01 UIU/ML
WBC # FLD AUTO: 7.74 K/UL

## 2025-05-01 ENCOUNTER — APPOINTMENT (OUTPATIENT)
Dept: ORTHOPEDIC SURGERY | Facility: CLINIC | Age: 85
End: 2025-05-01

## 2025-05-15 ENCOUNTER — APPOINTMENT (OUTPATIENT)
Dept: ORTHOPEDIC SURGERY | Facility: CLINIC | Age: 85
End: 2025-05-15

## 2025-06-27 ENCOUNTER — APPOINTMENT (OUTPATIENT)
Dept: ORTHOPEDIC SURGERY | Facility: CLINIC | Age: 85
End: 2025-06-27
Payer: MEDICARE

## 2025-06-27 PROBLEM — M19.072 PRIMARY OSTEOARTHRITIS OF LEFT FOOT: Status: ACTIVE | Noted: 2025-06-27

## 2025-06-27 PROBLEM — L84 CALLUS OF FOOT: Status: ACTIVE | Noted: 2025-06-27

## 2025-06-27 PROCEDURE — 99213 OFFICE O/P EST LOW 20 MIN: CPT

## 2025-06-27 PROCEDURE — 73610 X-RAY EXAM OF ANKLE: CPT | Mod: LT

## 2025-06-27 PROCEDURE — 73620 X-RAY EXAM OF FOOT: CPT | Mod: LT

## 2025-07-16 ENCOUNTER — RX RENEWAL (OUTPATIENT)
Age: 85
End: 2025-07-16

## 2025-07-17 ENCOUNTER — APPOINTMENT (OUTPATIENT)
Dept: ORTHOPEDIC SURGERY | Facility: CLINIC | Age: 85
End: 2025-07-17

## 2025-08-19 ENCOUNTER — APPOINTMENT (OUTPATIENT)
Dept: ORTHOPEDIC SURGERY | Facility: CLINIC | Age: 85
End: 2025-08-19
Payer: MEDICARE

## 2025-08-19 VITALS
WEIGHT: 146 LBS | DIASTOLIC BLOOD PRESSURE: 75 MMHG | HEART RATE: 74 BPM | BODY MASS INDEX: 30.64 KG/M2 | HEIGHT: 58 IN | SYSTOLIC BLOOD PRESSURE: 177 MMHG

## 2025-08-19 DIAGNOSIS — Z78.9 OTHER SPECIFIED HEALTH STATUS: ICD-10-CM

## 2025-08-19 DIAGNOSIS — M79.89 OTHER SPECIFIED SOFT TISSUE DISORDERS: ICD-10-CM

## 2025-08-19 DIAGNOSIS — M11.232 OTHER CHONDROCALCINOSIS, LEFT WRIST: ICD-10-CM

## 2025-08-19 DIAGNOSIS — M79.642 PAIN IN LEFT HAND: ICD-10-CM

## 2025-08-19 DIAGNOSIS — M19.042 PRIMARY OSTEOARTHRITIS, LEFT HAND: ICD-10-CM

## 2025-08-19 DIAGNOSIS — M25.532 PAIN IN LEFT WRIST: ICD-10-CM

## 2025-08-19 DIAGNOSIS — M19.041 PRIMARY OSTEOARTHRITIS, RIGHT HAND: ICD-10-CM

## 2025-08-19 DIAGNOSIS — M19.032 PRIMARY OSTEOARTHRITIS, LEFT WRIST: ICD-10-CM

## 2025-08-19 DIAGNOSIS — M65.242 CALCIFIC TENDINITIS, LEFT HAND: ICD-10-CM

## 2025-08-19 PROCEDURE — 20605 DRAIN/INJ JOINT/BURSA W/O US: CPT | Mod: LT

## 2025-08-19 PROCEDURE — 99215 OFFICE O/P EST HI 40 MIN: CPT | Mod: 25

## 2025-08-19 PROCEDURE — 73110 X-RAY EXAM OF WRIST: CPT | Mod: LT

## 2025-08-19 PROCEDURE — 20600 DRAIN/INJ JOINT/BURSA W/O US: CPT | Mod: 59,F2

## 2025-08-19 RX ORDER — MELOXICAM 7.5 MG/1
7.5 TABLET ORAL
Qty: 30 | Refills: 1 | Status: ACTIVE | COMMUNITY
Start: 2025-08-19 | End: 1900-01-01

## 2025-08-20 PROBLEM — M65.242 CALCIFIC TENDINITIS OF LEFT HAND: Status: ACTIVE | Noted: 2025-08-20

## 2025-08-20 PROBLEM — M79.89 SWELLING OF LEFT HAND: Status: ACTIVE | Noted: 2025-08-20

## 2025-08-20 PROBLEM — Z78.9 NON-SMOKER: Status: ACTIVE | Noted: 2025-08-20

## 2025-08-27 ENCOUNTER — APPOINTMENT (OUTPATIENT)
Dept: ORTHOPEDIC SURGERY | Facility: CLINIC | Age: 85
End: 2025-08-27
Payer: MEDICARE

## 2025-08-27 VITALS — WEIGHT: 146 LBS | HEIGHT: 58 IN | BODY MASS INDEX: 30.64 KG/M2

## 2025-08-27 VITALS — HEIGHT: 58 IN | BODY MASS INDEX: 30.64 KG/M2 | WEIGHT: 146 LBS

## 2025-08-27 DIAGNOSIS — M17.11 UNILATERAL PRIMARY OSTEOARTHRITIS, RIGHT KNEE: ICD-10-CM

## 2025-08-27 DIAGNOSIS — M17.12 UNILATERAL PRIMARY OSTEOARTHRITIS, LEFT KNEE: ICD-10-CM

## 2025-08-27 PROCEDURE — J3490M: CUSTOM | Mod: NC,JZ

## 2025-08-27 PROCEDURE — 20610 DRAIN/INJ JOINT/BURSA W/O US: CPT | Mod: 50

## 2025-08-27 PROCEDURE — 99214 OFFICE O/P EST MOD 30 MIN: CPT | Mod: 25

## 2025-08-27 PROCEDURE — 73564 X-RAY EXAM KNEE 4 OR MORE: CPT | Mod: 50
